# Patient Record
Sex: MALE | Race: WHITE | NOT HISPANIC OR LATINO | Employment: FULL TIME | URBAN - METROPOLITAN AREA
[De-identification: names, ages, dates, MRNs, and addresses within clinical notes are randomized per-mention and may not be internally consistent; named-entity substitution may affect disease eponyms.]

---

## 2017-02-16 ENCOUNTER — HOSPITAL ENCOUNTER (OUTPATIENT)
Dept: RADIOLOGY | Facility: HOSPITAL | Age: 32
Discharge: HOME | End: 2017-02-16
Attending: OTOLARYNGOLOGY
Payer: COMMERCIAL

## 2017-02-16 DIAGNOSIS — R05.9 COUGH: ICD-10-CM

## 2017-02-16 PROCEDURE — 71020 XR CHEST PA AND LATERAL PRE-OP: CPT | Mod: 26,,, | Performed by: RADIOLOGY

## 2017-02-16 PROCEDURE — 71020 XR CHEST PA AND LATERAL PRE-OP: CPT | Mod: TC

## 2017-03-09 ENCOUNTER — HOSPITAL ENCOUNTER (OUTPATIENT)
Dept: PREADMISSION TESTING | Facility: HOSPITAL | Age: 32
Discharge: HOME | End: 2017-03-09
Attending: OTOLARYNGOLOGY
Payer: COMMERCIAL

## 2017-03-09 ENCOUNTER — ANESTHESIA EVENT (OUTPATIENT)
Dept: SURGERY | Facility: HOSPITAL | Age: 32
End: 2017-03-09
Payer: COMMERCIAL

## 2017-03-09 DIAGNOSIS — Z01.818 PRE-OP EXAM: Primary | ICD-10-CM

## 2017-03-09 RX ORDER — SODIUM CHLORIDE, SODIUM LACTATE, POTASSIUM CHLORIDE, CALCIUM CHLORIDE 600; 310; 30; 20 MG/100ML; MG/100ML; MG/100ML; MG/100ML
INJECTION, SOLUTION INTRAVENOUS CONTINUOUS
Status: CANCELLED | OUTPATIENT
Start: 2017-03-09

## 2017-03-09 RX ORDER — LIDOCAINE HYDROCHLORIDE 10 MG/ML
1 INJECTION, SOLUTION EPIDURAL; INFILTRATION; INTRACAUDAL; PERINEURAL ONCE
Status: CANCELLED | OUTPATIENT
Start: 2017-03-09 | End: 2017-03-09

## 2017-03-09 RX ORDER — AMOXICILLIN AND CLAVULANATE POTASSIUM 875; 125 MG/1; MG/1
1 TABLET, FILM COATED ORAL 2 TIMES DAILY
Status: ON HOLD | COMMUNITY
Start: 2017-02-24 | End: 2017-03-14 | Stop reason: HOSPADM

## 2017-03-09 RX ORDER — CLINDAMYCIN PHOSPHATE 900 MG/50ML
900 INJECTION, SOLUTION INTRAVENOUS
Status: CANCELLED | OUTPATIENT
Start: 2017-03-14

## 2017-03-09 RX ORDER — DEXAMETHASONE SODIUM PHOSPHATE 10 MG/ML
12 INJECTION INTRAMUSCULAR; INTRAVENOUS
Status: CANCELLED | OUTPATIENT
Start: 2017-03-14

## 2017-03-09 NOTE — IP AVS SNAPSHOT
[facility]  [address]  [place] [address]  Phone: [phone]           Patient Discharge Instructions    Our goal is to set you up for success. This packet includes information on your condition, medications, and your home care. It will help you to care for yourself so you don't get sicker.     Please ask your nurse if you have any questions.        There are many details to remember when preparing for your surgery. Here is what you will need to do, please ask your nurse if there are more specific instructions and if you have any questions:    1. 24 hours before procedure Do not smoke or drink alcoholic beverages 24 hours prior to your procedure    2. Eating before procedure Do not eat or drink anything 8 hours before your procedure - this includes gum, mints, and candy.     3. Day of procedure Please remove all jewelry for the procedure. If you wear contact lenses, dentures, hearing aids or glasses, bring a container to put them in during your surgery and give to a family member for safekeeping.  If your doctor has scheduled you for an overnight stay, bring a small overnight bag with any personal items that you need.    4. After procedure Make arrangements in advance for transportation home by a responsible adult. It is not safe to drive a vehicle during the 24 hours following surgery.     PLEASE NOTE: You may be contacted the day before your surgery to confirm your surgery date and arrival time. The Surgery schedule has many variables which may affect the time of your surgery case. Family members should be available if your surgery time changes.                ** Verify the list of medication(s) below is accurate and up to date. Carry this with you in case of emergency. If your medications have changed, please notify your healthcare provider.             Medication List      Notice     You have not been prescribed any medications.               Please bring to all follow up  appointments:    1. A copy of your discharge instructions.  2. All medicines you are currently taking in their original bottles.  3. Identification and insurance card.    Please arrive 15 minutes ahead of scheduled appointment time.    Please call 24 hours in advance if you must reschedule your appointment and/or time.        Your Future Surgeries/Procedures     Mar 14, 2017   Surgery with Phoebe Benitez MD   [facility] ([facility])    [address] [place] [address] Ave  [place] [address]   [phone]                  Discharge Instructions       Your surgery is scheduled for 3/14/17.    Please report to Outpatient Surgery Intake Office on the 2nd FLOOR at 7:00a.m.          INSTRUCTIONS IMPORTANT!!!  ¨ Do not eat or drink after 12 midnight-including water. OK to brush teeth, no   gum, candy or mints!    ____  Proceed to [facility] on 3/9/17 for additional blood test.       ____  No powder, lotions or creams to surgical area.  ____  Please remove all jewelry, including piercings and leave at home.  ____  No money or valuables needed. Please leave at home.  ____  Please bring any documents given by your doctor.  ____  If going home the same day, arrange for a ride home. You will not be able to             drive if Anesthesia was used.  ____  Wear loose fitting clothing. Allow for dressings, bandages.  ____  Stop Aspirin, Ibuprofen, Motrin and Aleve at least 3-5 days before surgery, unless otherwise instructed by your doctor, or the nurse.   You MAY use Tylenol/acetaminophen until day of surgery.  ____  If you take diabetic medication, do not take am of surgery unless instructed by Doctor.  ____  Call MD for temperature above 101 degrees.  ____ Stop taking any Fish Oil supplement or any Vitamins that contain Vitamin E at least 5 days prior to surgery.  ____ Do Not wear your contact lenses the day of your procedure.  You may wear your glasses.        I have read or had read and  explained to me, and understand the above information.  Additional comments or instructions:  For additional questions call [phone]      Gargle with Listerine twice a day for 3 days prior to surgery, including the morning of surgery.        Anesthesia: General Anesthesia  Youre due to have surgery. During surgery, youll be given medication called anesthesia. (It is also called anesthetic.) This will keep you comfortable and pain-free. Your anesthesia provider will use general anesthesia. This sheet tells you more about it.  What is general anesthesia?     You are watched continuously during your procedure by the anesthesia provider   General anesthesia puts you into a state like deep sleep. It goes into the bloodstream (IV anesthetics), into the lungs (gas anesthetics), or both. You feel nothing during the procedure. You will not remember it. During the procedure, the anesthesia provider monitors you continuously. He or she checks your heart rate and rhythm, blood pressure, breathing, and blood oxygen.  · IV Anesthetics. IV anesthetics are given through an IV line in your arm. Theyre often given first. This is so you are asleep before a gas anesthetic is started. Some kinds of IV anesthetics relieve pain. Others relax you. Your doctor will decide which kind is best in your case.  · Gas Anesthetics. Gas anesthetics are breathed into the lungs. They are often used to keep you asleep. They can be given through a facemask or a tube placed in your larynx or trachea (breathing tube).  ¨ If you have a facemask, your anesthesia provider will most likely place it over your nose and mouth while youre still awake. Youll breathe oxygen through the mask as your IV anesthetic is started. Gas anesthetic may be added through the mask.  ¨ If you have a tube in the larynx or trachea, it will be inserted into your throat after youre asleep.  Anesthesia tools and medications  You will likely have:  · IV anesthetics. These are  put into an IV line into your bloodstream.  · Gas anesthetics. You breathe these anesthetics into your lungs, where they pass into your bloodstream.  · Pulse oximeter. This is a small clip that is attached to the end of your finger. This measures your blood oxygen level.  · Electrocardiography leads (electrodes). These are small sticky pads that are placed on your chest. They record your heart rate and rhythm.  · Blood pressure cuff. This reads your blood pressure.  Risks and possible complications  General anesthesia has some risks. These include:  · Breathing problems  · Nausea and vomiting  · Sore throat or hoarseness (usually temporary)  · Allergic reaction to the anesthetic  · Irregular heartbeat (rare)  · Cardiac arrest (rare)   Anesthesia safety  · Follow all instructions you are given for how long not to eat or drink before your procedure.  · Be sure your doctor knows what medications and drugs you take. This includes over-the-counter medications, herbs, supplements, alcohol or other drugs. You will be asked when those were last taken.  · Have an adult family member or friend drive you home after the procedure.  · For the first 24 hours after your surgery:  ¨ Do not drive or use heavy equipment.  ¨ Have a trusted family member or spouse make important decisions or sign documents.  ¨ Avoid alcohol.  ¨ Have a responsible adult stay with you. He or she can watch for problems and help keep you safe.  Date Last Reviewed: 10/16/2014  © 3120-0038 [employer]. [address] [place] [address]. All rights reserved. This information is not intended as a substitute for professional medical care. Always follow your healthcare professional's instructions.        Adult Tonsillectomy  The tonsils are 2 small masses of tissue at the back of the throat. They are part of the bodys immune system. This helps the body fight disease. In some people, the tonsils become infected or enlarged. This can cause  severe sore throats, snoring, or other problems. Tonsillectomy is surgery to remove the tonsils. Tonsillectomy may be recommended if you have obstruction causing sleep apnea, or recurring, chronic, or severe infections. This sheet tells you more about this surgery and what to expect.    Preparing for surgery  Prepare as you have been told. Tell your healthcare provider about all medicine you take. This includes over-the-counter drugs. It also includes herbs and other supplements. You may need to stop taking some or all of them before surgery as directed by your healthcare provider. Also, follow any directions youre given for not eating or drinking before surgery.  The day of surgery  The surgery takes about 60 minutes. You will likely go home on the same day.  Before the surgery  Here is what to expect before the surgery begins:  · An IV line is put into a vein in your arm or hand. This line supplies fluids and medicines.  · To keep you free of pain during the surgery, youre given general anesthesia. This medicine puts you into a state like deep sleep through the surgery.  During the surgery  Here is what to expect during the surgery:  · A special device is used to keep the mouth open.  · Other tools are used to remove the tonsils from the back of the throat. The tissue is taken out through the mouth.  · The device holding the mouth open is then removed.  After the surgery  You will be taken to a recovery room. Healthcare staff will make sure you can drink some liquids. They will also make sure your pain is being managed. When you are ready to leave the hospital, you will need to be driven home by an adult family member or friend.  Recovering at home  It will likely take about 2 weeks to heal from the surgery. During your recovery:  · Expect to have throat pain. You may also feel pain in your ears. This is referred pain from the throat, and is normal. Your post-surgery pain may come and go. It may be worse on  the 4th or 5th day after surgery.  · Talk as little as possible, if it is painful.  · Take pain medicine as directed.  · Do not drive while you are on opioid or narcotic pain medicine. Expect to feel sleepy or dizzy while you are taking this medicine.  · Do not use ibuprofen or aspirin for 14 days after surgery unless your healthcare provider says its OK. You may use acetaminophen as directed.  · Use 2 or 3 pillows under your head while resting. This will help keep swelling down.  · Drink lots of chilled liquids. Water, noncitrus juices, and frozen juice bars are good choices.  · Eat cold foods and soft foods, which are easiest to swallow. Try foods like ice cream, gelatin, scrambled eggs, pasta, and mashed potatoes.  · Avoid foods that require a lot of chewing. Also avoid foods that may scratch the throat, like toast or potato chips. Avoid hot, spicy, or acidic foods.  · Avoid strenuous activity for 2 to 3 weeks after surgery.  · Be aware that white patches will form in the throat during healing. These are scabs and are not a sign of infection. The patches will come off in 1 to 2 weeks and may cause bleeding. To minimize bleeding, drink lots of fluids. Gargling with cold water can help.  Call the healthcare provider  Call your healthcare provider if you have any of the following:  · Chest pain or trouble breathing (call 911)  · Fever of 100.4°F (38°C) or higher, or as directed by your healthcare provider  · Bright red bleeding from the mouth or nose  · Severe pain not relieved by medicine  · Signs of dehydration (dark urine, urinating less often)  · Heavy or persistent bleeding in the throat at any time  · Other signs or symptoms as indicated by your healthcare provider   Follow-up  Schedule a follow-up visit with your healthcare provider as advised. During this visit, the healthcare provider will make sure you are healing well. Ask any questions you have about the surgery or your recovery.  Risks and possible  complications   Risks of this surgery include:  · Infection  · Bleeding  · Injury to the lips or teeth  · Painful swallowing during recovery  · The need for a second surgery  · Risks of anesthesia    Date Last Reviewed: 6/11/2015  © 7967-4360 [employer]. [address] [place] [address]. All rights reserved. This information is not intended as a substitute for professional medical care. Always follow your healthcare professional's instructions.            Admission Information     Date & Time Provider Department CSN    3/9/2017  2:00 PM Phoebe Benitez MD [facility] [id number]      Care Providers     Provider Role Specialty Primary office phone    Phoebe Benitez MD Attending Provider Otolaryngology [phone]      Recent Lab Values     No lab values to display.      Allergies as of 3/9/2017     No Known Allergies      [facility]     [facility] Nurse Care Line - 24/7 Assistance  Unless otherwise directed by your provider, please contact [facility], our nurse care line that is available for 24/7 assistance.     Registered nurses in the [facility] provide clinical advisement, health education, appointment booking, and other advisory services.  Call for this free service at [phone].        Advance Directives     An advance directive is a document which, in the event you are no longer able to make decisions for yourself, tells your healthcare team what kind of treatment you do or do not want to receive, or who you would like to make those decisions for you.  If you do not currently have an advance directive, [facility] encourages you to create one.  For more information call:  [phone]), [phone] ([phone]),  or log on to [email or web address].        Language Assistance Services     ATTENTION: Language assistance services are available, free of charge. Please call [phone].      ATENCIÓN: Si  habla patrick, tiene a lowry disposición servicios gratuitos de asistencia lingüística. Llame al [phone].     PORTILLO Ý: N?u b?n nói Ti?ng Vi?t, có các d?ch v? h? tr? ngôn ng? mi?n phí dành cho b?n. G?i s? [phone].        [facility] Sign-Up     Activating your [facility] account is as easy as 1-2-3!     1) Visit [email or web address], select Sign Up Now, enter this activation code and your date of birth, then select Next.  [email or web address]  Expires: 4/23/2017  2:24 PM      2) Create a username and password to use when you visit [facility] in the future and select a security question in case you lose your password and select Next.    3) Enter your e-mail address and click Sign Up!    Additional Information  If you have questions, please e-mail [email or web address] or call [phone] to talk to our [facility] staff. Remember, [facility] is NOT to be used for urgent needs. For medical emergencies, dial 911.          [facility] complies with applicable Federal civil rights laws and does not discriminate on the basis of race, color, national origin, age, disability, or sex.

## 2017-03-09 NOTE — ANESTHESIA PREPROCEDURE EVALUATION
03/09/2017     Anjum Lee is a 31 y.o., male is scheduled for bilateral tonsillectomy under GETA on 3/14/2017.      [facility] Anesthesia Evaluation    I have reviewed the Patient Summary Reports.    I have reviewed the Nursing Notes.   I have reviewed the Medications.     Review of Systems  Anesthesia Hx:  No previous Anesthesia  Neg history of prior surgery. Denies Family Hx of Anesthesia complications.    Social:  Non-Smoker, No Alcohol Use    Hematology/Oncology:  Hematology Normal        EENT/Dental:   Has been on augmentin since 2/24/2017; no fever/chills Chronic Tonsillitis   Cardiovascular:  Cardiovascular Normal     Pulmonary:  Pulmonary Normal    Renal/:  Renal/ Normal     Hepatic/GI:  Hepatic/GI Normal    Neurological:  Neurology Normal    Endocrine:  Endocrine Normal        Physical Exam  General:  Well nourished    Airway/Jaw/Neck:  Airway Findings: Mouth Opening: Normal Tongue: Normal  General Airway Assessment: Adult  Mallampati: II  TM Distance: 4 - 6 cm        Eyes/Ears/Nose:  Eyes/Ears/Nose Findings: Tonsils bilaterally enlarged without exudate or erythema; airway visible    Dental:  Dental Findings: In tact   Chest/Lungs:  Chest/Lungs Clear    Heart/Vascular:  Heart Findings: Normal Heart murmur: negative    Abdomen:  Abdomen Findings: Normal      Mental Status:  Mental Status Findings: Normal        Anesthesia Plan  Type of Anesthesia, risks & benefits discussed:  Anesthesia Type:  general  Patient's Preference:   Intra-op Monitoring Plan:   Intra-op Monitoring Plan Comments:   Post Op Pain Control Plan:   Post Op Pain Control Plan Comments:   Induction:   IV  Beta Blocker:  Patient is not currently on a Beta-Blocker (No further documentation required).       Informed Consent: Patient understands risks and agrees with Anesthesia plan.  Questions answered.   ASA Score: 1     Day of  Surgery Review of History & Physical:        Anesthesia Plan Notes: Anesthesia consent will be obtained prior to procedure on 3/14/2017.          Ready For Surgery From Anesthesia Perspective.

## 2017-03-09 NOTE — DISCHARGE INSTRUCTIONS
Your surgery is scheduled for 3/14/17.    Please report to Outpatient Surgery Intake Office on the 2nd FLOOR at 7:00a.m.          INSTRUCTIONS IMPORTANT!!!  ¨ Do not eat or drink after 12 midnight-including water. OK to brush teeth, no   gum, candy or mints!    ____  Proceed to [facility] on 3/9/17 for additional blood test.       ____  No powder, lotions or creams to surgical area.  ____  Please remove all jewelry, including piercings and leave at home.  ____  No money or valuables needed. Please leave at home.  ____  Please bring any documents given by your doctor.  ____  If going home the same day, arrange for a ride home. You will not be able to             drive if Anesthesia was used.  ____  Wear loose fitting clothing. Allow for dressings, bandages.  ____  Stop Aspirin, Ibuprofen, Motrin and Aleve at least 3-5 days before surgery, unless otherwise instructed by your doctor, or the nurse.   You MAY use Tylenol/acetaminophen until day of surgery.  ____  If you take diabetic medication, do not take am of surgery unless instructed by Doctor.  ____  Call MD for temperature above 101 degrees.  ____ Stop taking any Fish Oil supplement or any Vitamins that contain Vitamin E at least 5 days prior to surgery.  ____ Do Not wear your contact lenses the day of your procedure.  You may wear your glasses.        I have read or had read and explained to me, and understand the above information.  Additional comments or instructions:  For additional questions call [phone]      Gargle with Listerine twice a day for 3 days prior to surgery, including the morning of surgery.        Anesthesia: General Anesthesia  Youre due to have surgery. During surgery, youll be given medication called anesthesia. (It is also called anesthetic.) This will keep you comfortable and pain-free. Your anesthesia provider will use general anesthesia. This sheet tells you more about it.  What is general anesthesia?     You are  watched continuously during your procedure by the anesthesia provider   General anesthesia puts you into a state like deep sleep. It goes into the bloodstream (IV anesthetics), into the lungs (gas anesthetics), or both. You feel nothing during the procedure. You will not remember it. During the procedure, the anesthesia provider monitors you continuously. He or she checks your heart rate and rhythm, blood pressure, breathing, and blood oxygen.  · IV Anesthetics. IV anesthetics are given through an IV line in your arm. Theyre often given first. This is so you are asleep before a gas anesthetic is started. Some kinds of IV anesthetics relieve pain. Others relax you. Your doctor will decide which kind is best in your case.  · Gas Anesthetics. Gas anesthetics are breathed into the lungs. They are often used to keep you asleep. They can be given through a facemask or a tube placed in your larynx or trachea (breathing tube).  ¨ If you have a facemask, your anesthesia provider will most likely place it over your nose and mouth while youre still awake. Youll breathe oxygen through the mask as your IV anesthetic is started. Gas anesthetic may be added through the mask.  ¨ If you have a tube in the larynx or trachea, it will be inserted into your throat after youre asleep.  Anesthesia tools and medications  You will likely have:  · IV anesthetics. These are put into an IV line into your bloodstream.  · Gas anesthetics. You breathe these anesthetics into your lungs, where they pass into your bloodstream.  · Pulse oximeter. This is a small clip that is attached to the end of your finger. This measures your blood oxygen level.  · Electrocardiography leads (electrodes). These are small sticky pads that are placed on your chest. They record your heart rate and rhythm.  · Blood pressure cuff. This reads your blood pressure.  Risks and possible complications  General anesthesia has some risks. These include:  · Breathing  problems  · Nausea and vomiting  · Sore throat or hoarseness (usually temporary)  · Allergic reaction to the anesthetic  · Irregular heartbeat (rare)  · Cardiac arrest (rare)   Anesthesia safety  · Follow all instructions you are given for how long not to eat or drink before your procedure.  · Be sure your doctor knows what medications and drugs you take. This includes over-the-counter medications, herbs, supplements, alcohol or other drugs. You will be asked when those were last taken.  · Have an adult family member or friend drive you home after the procedure.  · For the first 24 hours after your surgery:  ¨ Do not drive or use heavy equipment.  ¨ Have a trusted family member or spouse make important decisions or sign documents.  ¨ Avoid alcohol.  ¨ Have a responsible adult stay with you. He or she can watch for problems and help keep you safe.  Date Last Reviewed: 10/16/2014  © 6472-5958 [employer]. [address] [place] [address]. All rights reserved. This information is not intended as a substitute for professional medical care. Always follow your healthcare professional's instructions.        Adult Tonsillectomy  The tonsils are 2 small masses of tissue at the back of the throat. They are part of the bodys immune system. This helps the body fight disease. In some people, the tonsils become infected or enlarged. This can cause severe sore throats, snoring, or other problems. Tonsillectomy is surgery to remove the tonsils. Tonsillectomy may be recommended if you have obstruction causing sleep apnea, or recurring, chronic, or severe infections. This sheet tells you more about this surgery and what to expect.    Preparing for surgery  Prepare as you have been told. Tell your healthcare provider about all medicine you take. This includes over-the-counter drugs. It also includes herbs and other supplements. You may need to stop taking some or all of them before surgery as directed by your  healthcare provider. Also, follow any directions youre given for not eating or drinking before surgery.  The day of surgery  The surgery takes about 60 minutes. You will likely go home on the same day.  Before the surgery  Here is what to expect before the surgery begins:  · An IV line is put into a vein in your arm or hand. This line supplies fluids and medicines.  · To keep you free of pain during the surgery, youre given general anesthesia. This medicine puts you into a state like deep sleep through the surgery.  During the surgery  Here is what to expect during the surgery:  · A special device is used to keep the mouth open.  · Other tools are used to remove the tonsils from the back of the throat. The tissue is taken out through the mouth.  · The device holding the mouth open is then removed.  After the surgery  You will be taken to a recovery room. Healthcare staff will make sure you can drink some liquids. They will also make sure your pain is being managed. When you are ready to leave the hospital, you will need to be driven home by an adult family member or friend.  Recovering at home  It will likely take about 2 weeks to heal from the surgery. During your recovery:  · Expect to have throat pain. You may also feel pain in your ears. This is referred pain from the throat, and is normal. Your post-surgery pain may come and go. It may be worse on the 4th or 5th day after surgery.  · Talk as little as possible, if it is painful.  · Take pain medicine as directed.  · Do not drive while you are on opioid or narcotic pain medicine. Expect to feel sleepy or dizzy while you are taking this medicine.  · Do not use ibuprofen or aspirin for 14 days after surgery unless your healthcare provider says its OK. You may use acetaminophen as directed.  · Use 2 or 3 pillows under your head while resting. This will help keep swelling down.  · Drink lots of chilled liquids. Water, noncitrus juices, and frozen juice bars are  good choices.  · Eat cold foods and soft foods, which are easiest to swallow. Try foods like ice cream, gelatin, scrambled eggs, pasta, and mashed potatoes.  · Avoid foods that require a lot of chewing. Also avoid foods that may scratch the throat, like toast or potato chips. Avoid hot, spicy, or acidic foods.  · Avoid strenuous activity for 2 to 3 weeks after surgery.  · Be aware that white patches will form in the throat during healing. These are scabs and are not a sign of infection. The patches will come off in 1 to 2 weeks and may cause bleeding. To minimize bleeding, drink lots of fluids. Gargling with cold water can help.  Call the healthcare provider  Call your healthcare provider if you have any of the following:  · Chest pain or trouble breathing (call 911)  · Fever of 100.4°F (38°C) or higher, or as directed by your healthcare provider  · Bright red bleeding from the mouth or nose  · Severe pain not relieved by medicine  · Signs of dehydration (dark urine, urinating less often)  · Heavy or persistent bleeding in the throat at any time  · Other signs or symptoms as indicated by your healthcare provider   Follow-up  Schedule a follow-up visit with your healthcare provider as advised. During this visit, the healthcare provider will make sure you are healing well. Ask any questions you have about the surgery or your recovery.  Risks and possible complications   Risks of this surgery include:  · Infection  · Bleeding  · Injury to the lips or teeth  · Painful swallowing during recovery  · The need for a second surgery  · Risks of anesthesia    Date Last Reviewed: 6/11/2015  © 8223-0897 [employer]. [address] [place] [address], [place] [address]. All rights reserved. This information is not intended as a substitute for professional medical care. Always follow your healthcare professional's instructions.

## 2017-03-14 ENCOUNTER — ANESTHESIA (OUTPATIENT)
Dept: SURGERY | Facility: HOSPITAL | Age: 32
End: 2017-03-14
Payer: COMMERCIAL

## 2017-03-14 ENCOUNTER — HOSPITAL ENCOUNTER (OUTPATIENT)
Facility: HOSPITAL | Age: 32
Discharge: HOME | End: 2017-03-14
Attending: OTOLARYNGOLOGY | Admitting: OTOLARYNGOLOGY
Payer: COMMERCIAL

## 2017-03-14 VITALS
DIASTOLIC BLOOD PRESSURE: 72 MMHG | TEMPERATURE: 98 F | HEART RATE: 80 BPM | SYSTOLIC BLOOD PRESSURE: 132 MMHG | OXYGEN SATURATION: 96 % | RESPIRATION RATE: 16 BRPM

## 2017-03-14 DIAGNOSIS — J35.1 TONSILLAR HYPERTROPHY: ICD-10-CM

## 2017-03-14 DIAGNOSIS — Z01.818 PRE-OP EXAM: ICD-10-CM

## 2017-03-14 LAB — APTT BLDCRRT: 31.3 SEC

## 2017-03-14 PROCEDURE — 36415 COLL VENOUS BLD VENIPUNCTURE: CPT

## 2017-03-14 PROCEDURE — 25000003 PHARM REV CODE 250: Performed by: NURSE PRACTITIONER

## 2017-03-14 PROCEDURE — 88189 FLOWCYTOMETRY/READ 16 & >: CPT | Mod: ,,, | Performed by: PATHOLOGY

## 2017-03-14 PROCEDURE — 88304 TISSUE EXAM BY PATHOLOGIST: CPT | Mod: 26,,, | Performed by: PATHOLOGY

## 2017-03-14 PROCEDURE — 85730 THROMBOPLASTIN TIME PARTIAL: CPT

## 2017-03-14 PROCEDURE — 88184 FLOWCYTOMETRY/ TC 1 MARKER: CPT | Performed by: PATHOLOGY

## 2017-03-14 PROCEDURE — 36000707: Performed by: OTOLARYNGOLOGY

## 2017-03-14 PROCEDURE — 37000009 HC ANESTHESIA EA ADD 15 MINS: Performed by: OTOLARYNGOLOGY

## 2017-03-14 PROCEDURE — 88185 FLOWCYTOMETRY/TC ADD-ON: CPT | Mod: 59 | Performed by: PATHOLOGY

## 2017-03-14 PROCEDURE — 71000015 HC POSTOP RECOV 1ST HR: Performed by: OTOLARYNGOLOGY

## 2017-03-14 PROCEDURE — 71000033 HC RECOVERY, INTIAL HOUR: Performed by: OTOLARYNGOLOGY

## 2017-03-14 PROCEDURE — 37000008 HC ANESTHESIA 1ST 15 MINUTES: Performed by: OTOLARYNGOLOGY

## 2017-03-14 PROCEDURE — 88104 CYTOPATH FL NONGYN SMEARS: CPT | Performed by: PATHOLOGY

## 2017-03-14 PROCEDURE — 71000039 HC RECOVERY, EACH ADD'L HOUR: Performed by: OTOLARYNGOLOGY

## 2017-03-14 PROCEDURE — 25000003 PHARM REV CODE 250: Performed by: NURSE ANESTHETIST, CERTIFIED REGISTERED

## 2017-03-14 PROCEDURE — 63600175 PHARM REV CODE 636 W HCPCS: Performed by: NURSE ANESTHETIST, CERTIFIED REGISTERED

## 2017-03-14 PROCEDURE — 88104 CYTOPATH FL NONGYN SMEARS: CPT | Mod: 26,,, | Performed by: PATHOLOGY

## 2017-03-14 PROCEDURE — 36000706: Performed by: OTOLARYNGOLOGY

## 2017-03-14 PROCEDURE — 63600175 PHARM REV CODE 636 W HCPCS: Performed by: OTOLARYNGOLOGY

## 2017-03-14 PROCEDURE — 25000003 PHARM REV CODE 250: Performed by: OTOLARYNGOLOGY

## 2017-03-14 RX ORDER — ONDANSETRON 2 MG/ML
INJECTION INTRAMUSCULAR; INTRAVENOUS
Status: DISCONTINUED | OUTPATIENT
Start: 2017-03-14 | End: 2017-03-14

## 2017-03-14 RX ORDER — SUCCINYLCHOLINE CHLORIDE 20 MG/ML
INJECTION INTRAMUSCULAR; INTRAVENOUS
Status: DISCONTINUED | OUTPATIENT
Start: 2017-03-14 | End: 2017-03-14

## 2017-03-14 RX ORDER — LIDOCAINE HYDROCHLORIDE 10 MG/ML
1 INJECTION, SOLUTION EPIDURAL; INFILTRATION; INTRACAUDAL; PERINEURAL ONCE
Status: DISCONTINUED | OUTPATIENT
Start: 2017-03-14 | End: 2017-03-14 | Stop reason: HOSPADM

## 2017-03-14 RX ORDER — ONDANSETRON 8 MG/1
8 TABLET, ORALLY DISINTEGRATING ORAL ONCE
Status: DISCONTINUED | OUTPATIENT
Start: 2017-03-14 | End: 2017-03-14 | Stop reason: HOSPADM

## 2017-03-14 RX ORDER — PHENYLEPHRINE HYDROCHLORIDE 10 MG/ML
INJECTION INTRAVENOUS
Status: DISCONTINUED | OUTPATIENT
Start: 2017-03-14 | End: 2017-03-14

## 2017-03-14 RX ORDER — SODIUM CHLORIDE 0.9 % (FLUSH) 0.9 %
3 SYRINGE (ML) INJECTION
Status: DISCONTINUED | OUTPATIENT
Start: 2017-03-14 | End: 2017-03-14 | Stop reason: HOSPADM

## 2017-03-14 RX ORDER — CLINDAMYCIN PHOSPHATE 900 MG/50ML
900 INJECTION, SOLUTION INTRAVENOUS
Status: DISCONTINUED | OUTPATIENT
Start: 2017-03-14 | End: 2017-03-14 | Stop reason: HOSPADM

## 2017-03-14 RX ORDER — HYDROMORPHONE HYDROCHLORIDE 2 MG/ML
0.5 INJECTION, SOLUTION INTRAMUSCULAR; INTRAVENOUS; SUBCUTANEOUS EVERY 5 MIN PRN
Status: DISCONTINUED | OUTPATIENT
Start: 2017-03-14 | End: 2017-03-14 | Stop reason: HOSPADM

## 2017-03-14 RX ORDER — HYDROCODONE BITARTRATE AND ACETAMINOPHEN 5; 325 MG/1; MG/1
1 TABLET ORAL EVERY 4 HOURS PRN
Status: DISCONTINUED | OUTPATIENT
Start: 2017-03-14 | End: 2017-03-14 | Stop reason: HOSPADM

## 2017-03-14 RX ORDER — DEXAMETHASONE SODIUM PHOSPHATE 10 MG/ML
12 INJECTION INTRAMUSCULAR; INTRAVENOUS
Status: DISCONTINUED | OUTPATIENT
Start: 2017-03-14 | End: 2017-03-14 | Stop reason: HOSPADM

## 2017-03-14 RX ORDER — AMOXICILLIN AND CLAVULANATE POTASSIUM 600; 42.9 MG/5ML; MG/5ML
875 POWDER, FOR SUSPENSION ORAL EVERY 12 HOURS
Qty: 98 ML | Refills: 0 | Status: SHIPPED | OUTPATIENT
Start: 2017-03-14 | End: 2017-03-21

## 2017-03-14 RX ORDER — FENTANYL CITRATE 50 UG/ML
INJECTION, SOLUTION INTRAMUSCULAR; INTRAVENOUS
Status: DISCONTINUED | OUTPATIENT
Start: 2017-03-14 | End: 2017-03-14

## 2017-03-14 RX ORDER — PROPOFOL 10 MG/ML
VIAL (ML) INTRAVENOUS
Status: DISCONTINUED | OUTPATIENT
Start: 2017-03-14 | End: 2017-03-14

## 2017-03-14 RX ORDER — SODIUM CHLORIDE, SODIUM LACTATE, POTASSIUM CHLORIDE, CALCIUM CHLORIDE 600; 310; 30; 20 MG/100ML; MG/100ML; MG/100ML; MG/100ML
INJECTION, SOLUTION INTRAVENOUS CONTINUOUS
Status: DISCONTINUED | OUTPATIENT
Start: 2017-03-14 | End: 2017-03-14 | Stop reason: HOSPADM

## 2017-03-14 RX ORDER — ROCURONIUM BROMIDE 10 MG/ML
INJECTION, SOLUTION INTRAVENOUS
Status: DISCONTINUED | OUTPATIENT
Start: 2017-03-14 | End: 2017-03-14

## 2017-03-14 RX ORDER — HYDROCODONE BITARTRATE AND ACETAMINOPHEN 7.5; 325 MG/1; MG/1
1 TABLET ORAL EVERY 4 HOURS PRN
Qty: 30 TABLET | Refills: 0 | Status: SHIPPED | OUTPATIENT
Start: 2017-03-14 | End: 2020-09-24

## 2017-03-14 RX ORDER — DEXTROSE, SODIUM CHLORIDE, SODIUM LACTATE, POTASSIUM CHLORIDE, AND CALCIUM CHLORIDE 5; .6; .31; .03; .02 G/100ML; G/100ML; G/100ML; G/100ML; G/100ML
INJECTION, SOLUTION INTRAVENOUS CONTINUOUS
Status: DISCONTINUED | OUTPATIENT
Start: 2017-03-14 | End: 2017-03-14 | Stop reason: HOSPADM

## 2017-03-14 RX ORDER — ONDANSETRON 4 MG/1
4 TABLET, ORALLY DISINTEGRATING ORAL EVERY 6 HOURS PRN
Qty: 15 TABLET | Refills: 1 | Status: SHIPPED | OUTPATIENT
Start: 2017-03-14 | End: 2020-09-24

## 2017-03-14 RX ORDER — ACETAMINOPHEN 10 MG/ML
INJECTION, SOLUTION INTRAVENOUS
Status: DISCONTINUED | OUTPATIENT
Start: 2017-03-14 | End: 2017-03-14

## 2017-03-14 RX ORDER — MIDAZOLAM HYDROCHLORIDE 1 MG/ML
INJECTION, SOLUTION INTRAMUSCULAR; INTRAVENOUS
Status: DISCONTINUED | OUTPATIENT
Start: 2017-03-14 | End: 2017-03-14

## 2017-03-14 RX ORDER — LIDOCAINE HCL/PF 100 MG/5ML
SYRINGE (ML) INTRAVENOUS
Status: DISCONTINUED | OUTPATIENT
Start: 2017-03-14 | End: 2017-03-14

## 2017-03-14 RX ORDER — ONDANSETRON 2 MG/ML
4 INJECTION INTRAMUSCULAR; INTRAVENOUS ONCE AS NEEDED
Status: DISCONTINUED | OUTPATIENT
Start: 2017-03-14 | End: 2017-03-14 | Stop reason: HOSPADM

## 2017-03-14 RX ADMIN — CLINDAMYCIN PHOSPHATE 900 MG: 18 INJECTION, SOLUTION INTRAVENOUS at 09:03

## 2017-03-14 RX ADMIN — ONDANSETRON 8 MG: 2 INJECTION, SOLUTION INTRAMUSCULAR; INTRAVENOUS at 10:03

## 2017-03-14 RX ADMIN — SUCCINYLCHOLINE CHLORIDE 120 MG: 20 INJECTION, SOLUTION INTRAMUSCULAR; INTRAVENOUS at 09:03

## 2017-03-14 RX ADMIN — DEXAMETHASONE SODIUM PHOSPHATE 12 MG: 10 INJECTION, SOLUTION INTRAMUSCULAR; INTRAVENOUS at 09:03

## 2017-03-14 RX ADMIN — MIDAZOLAM 2 MG: 1 INJECTION INTRAMUSCULAR; INTRAVENOUS at 09:03

## 2017-03-14 RX ADMIN — ACETAMINOPHEN 1000 MG: 10 INJECTION, SOLUTION INTRAVENOUS at 10:03

## 2017-03-14 RX ADMIN — PROPOFOL 50 MG: 10 INJECTION, EMULSION INTRAVENOUS at 09:03

## 2017-03-14 RX ADMIN — FENTANYL CITRATE 100 MCG: 50 INJECTION, SOLUTION INTRAMUSCULAR; INTRAVENOUS at 09:03

## 2017-03-14 RX ADMIN — PROPOFOL 150 MG: 10 INJECTION, EMULSION INTRAVENOUS at 09:03

## 2017-03-14 RX ADMIN — ROCURONIUM BROMIDE 5 MG: 10 INJECTION, SOLUTION INTRAVENOUS at 09:03

## 2017-03-14 RX ADMIN — SODIUM CHLORIDE, SODIUM LACTATE, POTASSIUM CHLORIDE, AND CALCIUM CHLORIDE: .6; .31; .03; .02 INJECTION, SOLUTION INTRAVENOUS at 09:03

## 2017-03-14 RX ADMIN — PHENYLEPHRINE HYDROCHLORIDE 50 MCG: 10 INJECTION INTRAVENOUS at 10:03

## 2017-03-14 RX ADMIN — SODIUM CHLORIDE, SODIUM LACTATE, POTASSIUM CHLORIDE, AND CALCIUM CHLORIDE: .6; .31; .03; .02 INJECTION, SOLUTION INTRAVENOUS at 10:03

## 2017-03-14 RX ADMIN — FENTANYL CITRATE 50 MCG: 50 INJECTION, SOLUTION INTRAMUSCULAR; INTRAVENOUS at 10:03

## 2017-03-14 RX ADMIN — LIDOCAINE HYDROCHLORIDE 50 MG: 20 INJECTION, SOLUTION INTRAVENOUS at 09:03

## 2017-03-14 NOTE — IP AVS SNAPSHOT
[facility]  [address]  [place] [address]  Phone: [phone]           Patient Discharge Instructions     Our goal is to set you up for success. This packet includes information on your condition, medications, and your home care. It will help you to care for yourself so you don't get sicker and need to go back to the hospital.     Please ask your nurse if you have any questions.        There are many details to remember when preparing to leave the hospital. Here is what you will need to do:    1. Take your medicine. If you are prescribed medications, review your Medication List in the following pages. You may have new medications to  at the pharmacy and others that you'll need to stop taking. Review the instructions for how and when to take your medications. Talk with your doctor or nurses if you are unsure of what to do.     2. Go to your follow-up appointments. Specific follow-up information is listed in the following pages. Your may be contacted by a transition nurse or clinical provider about future appointments. Be sure we have all of the phone numbers to reach you, if needed. Please contact your provider's office if you are unable to make an appointment.     3. Watch for warning signs. Your doctor or nurse will give you detailed warning signs to watch for and when to call for assistance. These instructions may also include educational information about your condition. If you experience any of warning signs to your health, call your doctor.               ** Verify the list of medication(s) below is accurate and up to date. Carry this with you in case of emergency. If your medications have changed, please notify your healthcare provider.             Medication List      START taking these medications        Additional Info                      amoxicillin-clavulanate 600-42.9 mg/5 mL Susr   Commonly known as:  AUGMENTIN   Quantity:  98 mL   Refills:  0   Dose:  875 mg of  amoxicillin   Replaces:  amoxicillin-clavulanate 875-125mg 875-125 mg per tablet    Instructions:  Take 7 mLs (840 mg total) by mouth every 12 (twelve) hours.     Begin Date    AM    Noon    PM    Bedtime       hydrocodone-acetaminophen 7.5-325mg 7.5-325 mg per tablet   Commonly known as:  NORCO   Quantity:  30 tablet   Refills:  0   Dose:  1 tablet    Instructions:  Take 1 tablet by mouth every 4 (four) hours as needed for Pain (prn moderate pain.).     Begin Date    AM    Noon    PM    Bedtime       ondansetron 4 MG Tbdl   Commonly known as:  ZOFRAN-ODT   Quantity:  15 tablet   Refills:  1   Dose:  4 mg    Instructions:  Take 1 tablet (4 mg total) by mouth every 6 (six) hours as needed.     Begin Date    AM    Noon    PM    Bedtime         STOP taking these medications     amoxicillin-clavulanate 875-125mg 875-125 mg per tablet   Commonly known as:  AUGMENTIN   Replaced by:  amoxicillin-clavulanate 600-42.9 mg/5 mL Susr            Where to Get Your Medications      You can get these medications from any pharmacy     Bring a paper prescription for each of these medications     amoxicillin-clavulanate 600-42.9 mg/5 mL Susr    hydrocodone-acetaminophen 7.5-325mg 7.5-325 mg per tablet    ondansetron 4 MG Tbdl                  Please bring to all follow up appointments:    1. A copy of your discharge instructions.  2. All medicines you are currently taking in their original bottles.  3. Identification and insurance card.    Please arrive 15 minutes ahead of scheduled appointment time.    Please call 24 hours in advance if you must reschedule your appointment and/or time.        Follow-up Information     Follow up In 2 weeks.        Follow up with Phoebe Benitez MD. Schedule an appointment as soon as possible for a visit in 2 weeks.    Specialty:  Otolaryngology    Contact information:    [address]  [address]  [place] [address] [phone]          Discharge Instructions     Future Orders    Call MD  for:  persistent nausea and vomiting     Call MD for:  severe uncontrolled pain     Call MD for:  temperature >100.4     Call MD for:     Comments:    Call M.D. If questions or problems.    Diet general     Comments:    Cool soft diet    Questions:    Total calories:      Fat restriction, if any:      Protein restriction, if any:      Na restriction, if any:      Fluid restriction:      Additional restrictions:      No dressing needed     Other restrictions (specify):     Scheduling Instructions:    See my office T and A instruction sheet.    Comments:    Keep head elevated.  No nose blowing.  No heavy lifting or bending over.        Discharge Instructions       ***NO HEAVY LIFTING OR BENDING  ***NO STRAWS.  ***COOL, SOFT DIET.    DIET: You may resume your home diet. If nausea is present, increase your diet gradually with fluids and bland foods    ACTIVITY LEVEL: If you have received sedation or an anesthetic, you may feel sleepy for several hours. Rest until you are more awake. Gradually resume your normal activities    Medications: Pain medication should be taken only if needed and as directed. If antibiotics are prescribed, the medication should be taken until completed. You will be given an updated list of you medications.    No driving, alcoholic beverages or signing legal documents for next 24 hours or while taking pain medication.       CALL THE DOCTOR:    For any obvious bleeding (some dried blood over the incision is normal).      Redness, swelling, foul smell around incision or fever over 101.   Shortness of breath, Coughing up Bloody sputum, Pains or Swelling in your Calves .   Persistent pain or nausea not relieved by medication.    If any unusual problems or difficulties occur contact your doctor. If you cannot contact your doctor but feel your signs and symptoms warrant a physicians attention return to the emergency room.      Discharge Instructions: After Your Surgery  Youve just had surgery.  During surgery you were given medicine called anesthesia to keep you relaxed and free of pain. After surgery you may have some pain or nausea. This is common. Here are some tips for feeling better and getting well after surgery.     Stay on schedule with your medication.   Going home  Your doctor or nurse will show you how to take care of yourself when you go home. He or she will also answer your questions. Have an adult family member or friend drive you home. For the first 24 hours after your surgery:  · Do not drive or use heavy equipment.  · Do not make important decisions or sign legal papers.  · Do not drink alcohol.  · Have someone stay with you, if needed. He or she can watch for problems and help keep you safe.  Be sure to go to all follow-up visits with your doctor. And rest after your surgery for as long as your doctor tells you to.  Coping with pain  If you have pain after surgery, pain medicine will help you feel better. Take it as told, before pain becomes severe. Also, ask your doctor or pharmacist about other ways to control pain. This might be with heat, ice, or relaxation. And follow any other instructions your surgeon or nurse gives you.  Tips for taking pain medicine  To get the best relief possible, remember these points:  · Pain medicines can upset your stomach. Taking them with a little food may help.  · Most pain relievers taken by mouth need at least 20 to 30 minutes to start to work.  · Taking medicine on a schedule can help you remember to take it. Try to time your medicine so that you can take it before starting an activity. This might be before you get dressed, go for a walk, or sit down for dinner.  · Constipation is a common side effect of pain medicines. Call your doctor before taking any medicines such as laxatives or stool softeners to help ease constipation. Also ask if you should skip any foods. Drinking lots of fluids and eating foods such as fruits and vegetables that are high in  fiber can also help. Remember, do not take laxatives unless your surgeon has prescribed them.  · Drinking alcohol and taking pain medicine can cause dizziness and slow your breathing. It can even be deadly. Do not drink alcohol while taking pain medicine.  · Pain medicine can make you react more slowly to things. Do not drive or run machinery while taking pain medicine.  Your health care provider may tell you to take acetaminophen to help ease your pain. Ask him or her how much you are supposed to take each day. Acetaminophen or other pain relievers may interact with your prescription medicines or other over-the-counter (OTC) drugs. Some prescription medicines have acetaminophen and other ingredients. Using both prescription and OTC acetaminophen for pain can cause you to overdose. Read the labels on your OTC medicines with care. This will help you to clearly know the list of ingredients, how much to take, and any warnings. It may also help you not take too much acetaminophen. If you have questions or do not understand the information, ask your pharmacist or health care provider to explain it to you before you take the OTC medicine.  Managing nausea  Some people have an upset stomach after surgery. This is often because of anesthesia, pain, or pain medicine, or the stress of surgery. These tips will help you handle nausea and eat healthy foods as you get better. If you were on a special food plan before surgery, ask your doctor if you should follow it while you get better. These tips may help:  · Do not push yourself to eat. Your body will tell you when to eat and how much.  · Start off with clear liquids and soup. They are easier to digest.  · Next try semi-solid foods, such as mashed potatoes, applesauce, and gelatin, as you feel ready.  · Slowly move to solid foods. Dont eat fatty, rich, or spicy foods at first.  · Do not force yourself to have 3 large meals a day. Instead eat smaller amounts more often.  · Take  pain medicines with a small amount of solid food, such as crackers or toast, to avoid nausea.     Call your surgeon if  · You still have pain an hour after taking medicine. The medicine may not be strong enough.  · You feel too sleepy, dizzy, or groggy. The medicine may be too strong.  · You have side effects like nausea, vomiting, or skin changes, such as rash, itching, or hives.       If you have obstructive sleep apnea  You were given anesthesia medicine during surgery to keep you comfortable and free of pain. After surgery, you may have more apnea spells because of this medicine and other medicines you were given. The spells may last longer than usual.   At home:  · Keep using the continuous positive airway pressure (CPAP) device when you sleep. Unless your health care provider tells you not to, use it when you sleep, day or night. CPAP is a common device used to treat obstructive sleep apnea.  · Talk with your provider before taking any pain medicine, muscle relaxants, or sedatives. Your provider will tell you about the possible dangers of taking these medicines.  Date Last Reviewed: 10/16/2014  © 2961-5438 [employer]. [address] [place] [address]. All rights reserved. This information is not intended as a substitute for professional medical care. Always follow your healthcare professional's instructions.      Tonsillectomy, Post-Op Bleeding  You have had surgery to remove your tonsils. Bleeding at the surgery site can happen after surgery. The doctor can often control it using direct pressure or applying medicine to shrink the blood vessels. If this fails, you will need to return to the operating room for further treatment. Notify your doctor at once or return to this hospital if there are signs of any further bleeding.  Home Care  Your throat will be sore. Throat pain after surgery improves over the first 3-5 days. It is normal to have more pain at the end of the first week before  it finally goes away.  Soft foods will be easier to swallow.  Drink plenty of fluids to prevent dehydration. You must continue to drink even though your throat hurts.  For pain relief, suck on ice cubes or frozen fruit pops, eat ice cream or sherbet, and drink cold liquids. You may also use liquid acetaminophen. Avoid taking ibuprofen or aspirin. These may increase bleeding risk. If your doctor has prescribed pain medication, take this as directed.   If antibiotics were prescribed, take these as directed until they are gone.  Do not overheat your home since this dries the air and may irritate throat tissues, especially at night. A cool-mist humidifier in the bedroom may help.  Avoid exposure to people with colds or the flu during the recovery period. You may be more likely to get ill during this time.  Smoking irritates the surgery site. If you smoke, this is a good time to stop.  Avoid any heavy exercise, lifting, or straining for the next 10 days.  Follow-up care  Follow up with your doctor or this facility as advised.  When to see medical advice  Call your doctor right away if any of the following occur:  Trouble speaking, swallowing, or breathing  Nausea and vomiting  Bleeding from the mouth  Fever over 100.4°F (38.3ºC)  Increasing pain not controlled by pain medications  Signs of dehydration: Very dark urine, less urine, dry mouth, weakness  Date Last Reviewed: 4/20/2015 © 2000-2016 [employer]. [address] [place] [address]. All rights reserved. This information is not intended as a substitute for professional medical care. Always follow your healthcare professional's instructions.        After Tonsillectomy/Adenoidectomy     Drinking plenty of fluids will help your child recover.   Your child has had surgery to remove tonsils and/or adenoids. Your child will need time to get better. Below are guidelines for your childs recovery.  Pain and Activity  · Expect your child to have some  throat or ear pain for 1-2 weeks.  · Limit activity for 1-2 weeks or as advised.  Diet  Make sure your child gets enough fluids and nutrients. Food and drink guidelines include:  · Give lots of fluids. Good choices are water, popsicles, and mild juices. (Do not give citrus juice or other acidic juices.)  · Give soft foods to eat. These include gelatin, pudding, ice cream, scrambled eggs, pasta, and mashed foods.  · Do not give spicy, acidic, or rough foods. These include fresh fruits, toast, crackers, and potato chips.  Medication  Give only medications approved by your childs health care provider. Follow directions closely when giving your child medications.  · Your child may be prescribed pain medication.  · Do not give your child ibuprofen or aspirin. They may cause bleeding. If needed for discomfort, you can give your child acetaminophen instead.  When to Call Your Child's Health Care Provider  Mild pain and a slight fever are normal after surgery. The surgical site will turn whitish while it is healing. This is normal and not an infection. But call your child's health care provider right away if your otherwise healthy child has any of the following:  · Persistent fever:  ¨ In a child 3 to 36 months, a rectal temperature of 102°F (39.0°C) or higher  ¨ In a child of any age who has a temperature of 103°F (39.4°C) or higher  ¨ A fever that lasts more than 24-hours in a child under 2 years old, or for 3 days in a child 2 years or older  ¨ Your child has had a seizure caused by the fever  · Severe pain not relieved by medication  · Bright red bleeding, which includes fast bleeding, spitting, or coughing up a large clot, or blood-tinged spit that continues  · Trouble breathing   Date Last Reviewed: 9/8/2014  © 0616-0346 [employer]. [address] [place] [address], [place] [address]. All rights reserved. This information is not intended as a substitute for professional medical care. Always follow your  healthcare professional's instructions.              Admission Information     Date & Time Provider Department CSN    3/14/2017  7:03 AM Phoebe Benitez MD [facility] [id number]      Care Providers     Provider Role Specialty Primary office phone    Phoebe Benitez MD Attending Provider Otolaryngology [phone]    Phoebe Benitez MD Surgeon  Otolaryngology [phone]      Your Vitals Were     BP Pulse Temp Resp SpO2       137/78 72 97.9 °F (36.6 °C) (Oral) 15 95%       Recent Lab Values     No lab values to display.      Pending Labs     Order Current Status    Leukemia/Lymphoma Screen - Tissue Other (Specify Comments Below) (tonsils) In process    Specimen to Pathology - Surgery In process      Allergies as of 3/14/2017     No Known Allergies      [facility]     [facility] Nurse Care Line - 24/7 Assistance  Unless otherwise directed by your provider, please contact [facility], our nurse care line that is available for 24/7 assistance.     Registered nurses in the [facility] provide clinical advisement, health education, appointment booking, and other advisory services.  Call for this free service at [phone].        Advance Directives     An advance directive is a document which, in the event you are no longer able to make decisions for yourself, tells your healthcare team what kind of treatment you do or do not want to receive, or who you would like to make those decisions for you.  If you do not currently have an advance directive, [facility] encourages you to create one.  For more information call:  [phone]), [phone] ([phone]),  or log on to [email or web address].        Language Assistance Services     ATTENTION: Language assistance services are available, free of charge. Please call [phone].      ATENCIÓN: Si habla español, tiene a lowry disposición servicios gratuitos de asistencia lingüística.  Ronnie martino [phone].     [facility] Ý: N?u b?n nói Ti?ng Vi?t, có các d?ch v? h? tr? ngôn ng? mi?n phí dành cho b?n. G?i s? [phone].        [facility] Sign-Up     Activating your [facility] account is as easy as 1-2-3!     1) Visit [email or web address], select Sign Up Now, enter this activation code and your date of birth, then select Next.  [email or web address]  Expires: 4/23/2017  3:24 PM      2) Create a username and password to use when you visit [facility] in the future and select a security question in case you lose your password and select Next.    3) Enter your e-mail address and click Sign Up!    Additional Information  If you have questions, please e-mail [email or web address] or call [phone] to talk to our [facility] staff. Remember, [facility] is NOT to be used for urgent needs. For medical emergencies, dial 911.          [facility] complies with applicable Federal civil rights laws and does not discriminate on the basis of race, color, national origin, age, disability, or sex.

## 2017-03-14 NOTE — PLAN OF CARE
Patient sitting up on side of the bed dressed, family at bedside. Patient aaox3. Vss. States he has a slight sore throat but refuses pain medication. Prescriptions provided to the patient. Instructions given to the patient and his family per MD orders with time allotted for questioning, verbalized understanding. Patient discharged via wheelchair to the car with family.

## 2017-03-14 NOTE — PLAN OF CARE
VSS. AAOx 3. Denies pain or discomfort @ this time. No changes noted. Report given to KEVAN Lacey, with time allotted for questions.

## 2017-03-14 NOTE — DISCHARGE INSTRUCTIONS
***NO HEAVY LIFTING OR BENDING  ***NO STRAWS.  ***COOL, SOFT DIET.    DIET: You may resume your home diet. If nausea is present, increase your diet gradually with fluids and bland foods    ACTIVITY LEVEL: If you have received sedation or an anesthetic, you may feel sleepy for several hours. Rest until you are more awake. Gradually resume your normal activities    Medications: Pain medication should be taken only if needed and as directed. If antibiotics are prescribed, the medication should be taken until completed. You will be given an updated list of you medications.    No driving, alcoholic beverages or signing legal documents for next 24 hours or while taking pain medication.       CALL THE DOCTOR:    For any obvious bleeding (some dried blood over the incision is normal).      Redness, swelling, foul smell around incision or fever over 101.   Shortness of breath, Coughing up Bloody sputum, Pains or Swelling in your Calves .   Persistent pain or nausea not relieved by medication.    If any unusual problems or difficulties occur contact your doctor. If you cannot contact your doctor but feel your signs and symptoms warrant a physicians attention return to the emergency room.      Discharge Instructions: After Your Surgery  Youve just had surgery. During surgery you were given medicine called anesthesia to keep you relaxed and free of pain. After surgery you may have some pain or nausea. This is common. Here are some tips for feeling better and getting well after surgery.     Stay on schedule with your medication.   Going home  Your doctor or nurse will show you how to take care of yourself when you go home. He or she will also answer your questions. Have an adult family member or friend drive you home. For the first 24 hours after your surgery:  · Do not drive or use heavy equipment.  · Do not make important decisions or sign legal papers.  · Do not drink alcohol.  · Have someone stay with you, if needed.  He or she can watch for problems and help keep you safe.  Be sure to go to all follow-up visits with your doctor. And rest after your surgery for as long as your doctor tells you to.  Coping with pain  If you have pain after surgery, pain medicine will help you feel better. Take it as told, before pain becomes severe. Also, ask your doctor or pharmacist about other ways to control pain. This might be with heat, ice, or relaxation. And follow any other instructions your surgeon or nurse gives you.  Tips for taking pain medicine  To get the best relief possible, remember these points:  · Pain medicines can upset your stomach. Taking them with a little food may help.  · Most pain relievers taken by mouth need at least 20 to 30 minutes to start to work.  · Taking medicine on a schedule can help you remember to take it. Try to time your medicine so that you can take it before starting an activity. This might be before you get dressed, go for a walk, or sit down for dinner.  · Constipation is a common side effect of pain medicines. Call your doctor before taking any medicines such as laxatives or stool softeners to help ease constipation. Also ask if you should skip any foods. Drinking lots of fluids and eating foods such as fruits and vegetables that are high in fiber can also help. Remember, do not take laxatives unless your surgeon has prescribed them.  · Drinking alcohol and taking pain medicine can cause dizziness and slow your breathing. It can even be deadly. Do not drink alcohol while taking pain medicine.  · Pain medicine can make you react more slowly to things. Do not drive or run machinery while taking pain medicine.  Your health care provider may tell you to take acetaminophen to help ease your pain. Ask him or her how much you are supposed to take each day. Acetaminophen or other pain relievers may interact with your prescription medicines or other over-the-counter (OTC) drugs. Some prescription medicines  have acetaminophen and other ingredients. Using both prescription and OTC acetaminophen for pain can cause you to overdose. Read the labels on your OTC medicines with care. This will help you to clearly know the list of ingredients, how much to take, and any warnings. It may also help you not take too much acetaminophen. If you have questions or do not understand the information, ask your pharmacist or health care provider to explain it to you before you take the OTC medicine.  Managing nausea  Some people have an upset stomach after surgery. This is often because of anesthesia, pain, or pain medicine, or the stress of surgery. These tips will help you handle nausea and eat healthy foods as you get better. If you were on a special food plan before surgery, ask your doctor if you should follow it while you get better. These tips may help:  · Do not push yourself to eat. Your body will tell you when to eat and how much.  · Start off with clear liquids and soup. They are easier to digest.  · Next try semi-solid foods, such as mashed potatoes, applesauce, and gelatin, as you feel ready.  · Slowly move to solid foods. Dont eat fatty, rich, or spicy foods at first.  · Do not force yourself to have 3 large meals a day. Instead eat smaller amounts more often.  · Take pain medicines with a small amount of solid food, such as crackers or toast, to avoid nausea.     Call your surgeon if  · You still have pain an hour after taking medicine. The medicine may not be strong enough.  · You feel too sleepy, dizzy, or groggy. The medicine may be too strong.  · You have side effects like nausea, vomiting, or skin changes, such as rash, itching, or hives.       If you have obstructive sleep apnea  You were given anesthesia medicine during surgery to keep you comfortable and free of pain. After surgery, you may have more apnea spells because of this medicine and other medicines you were given. The spells may last longer than  usual.   At home:  · Keep using the continuous positive airway pressure (CPAP) device when you sleep. Unless your health care provider tells you not to, use it when you sleep, day or night. CPAP is a common device used to treat obstructive sleep apnea.  · Talk with your provider before taking any pain medicine, muscle relaxants, or sedatives. Your provider will tell you about the possible dangers of taking these medicines.  Date Last Reviewed: 10/16/2014  © 0664-5774 [employer]. [address] [place] [address]. All rights reserved. This information is not intended as a substitute for professional medical care. Always follow your healthcare professional's instructions.      Tonsillectomy, Post-Op Bleeding  You have had surgery to remove your tonsils. Bleeding at the surgery site can happen after surgery. The doctor can often control it using direct pressure or applying medicine to shrink the blood vessels. If this fails, you will need to return to the operating room for further treatment. Notify your doctor at once or return to this hospital if there are signs of any further bleeding.  Home Care  Your throat will be sore. Throat pain after surgery improves over the first 3-5 days. It is normal to have more pain at the end of the first week before it finally goes away.  Soft foods will be easier to swallow.  Drink plenty of fluids to prevent dehydration. You must continue to drink even though your throat hurts.  For pain relief, suck on ice cubes or frozen fruit pops, eat ice cream or sherbet, and drink cold liquids. You may also use liquid acetaminophen. Avoid taking ibuprofen or aspirin. These may increase bleeding risk. If your doctor has prescribed pain medication, take this as directed.   If antibiotics were prescribed, take these as directed until they are gone.  Do not overheat your home since this dries the air and may irritate throat tissues, especially at night. A cool-mist  humidifier in the bedroom may help.  Avoid exposure to people with colds or the flu during the recovery period. You may be more likely to get ill during this time.  Smoking irritates the surgery site. If you smoke, this is a good time to stop.  Avoid any heavy exercise, lifting, or straining for the next 10 days.  Follow-up care  Follow up with your doctor or this facility as advised.  When to see medical advice  Call your doctor right away if any of the following occur:  Trouble speaking, swallowing, or breathing  Nausea and vomiting  Bleeding from the mouth  Fever over 100.4°F (38.3ºC)  Increasing pain not controlled by pain medications  Signs of dehydration: Very dark urine, less urine, dry mouth, weakness  Date Last Reviewed: 4/20/2015 © 2000-2016 [employer]. [address] [place] [address]. All rights reserved. This information is not intended as a substitute for professional medical care. Always follow your healthcare professional's instructions.        After Tonsillectomy/Adenoidectomy     Drinking plenty of fluids will help your child recover.   Your child has had surgery to remove tonsils and/or adenoids. Your child will need time to get better. Below are guidelines for your childs recovery.  Pain and Activity  · Expect your child to have some throat or ear pain for 1-2 weeks.  · Limit activity for 1-2 weeks or as advised.  Diet  Make sure your child gets enough fluids and nutrients. Food and drink guidelines include:  · Give lots of fluids. Good choices are water, popsicles, and mild juices. (Do not give citrus juice or other acidic juices.)  · Give soft foods to eat. These include gelatin, pudding, ice cream, scrambled eggs, pasta, and mashed foods.  · Do not give spicy, acidic, or rough foods. These include fresh fruits, toast, crackers, and potato chips.  Medication  Give only medications approved by your childs health care provider. Follow directions closely when giving your  child medications.  · Your child may be prescribed pain medication.  · Do not give your child ibuprofen or aspirin. They may cause bleeding. If needed for discomfort, you can give your child acetaminophen instead.  When to Call Your Child's Health Care Provider  Mild pain and a slight fever are normal after surgery. The surgical site will turn whitish while it is healing. This is normal and not an infection. But call your child's health care provider right away if your otherwise healthy child has any of the following:  · Persistent fever:  ¨ In a child 3 to 36 months, a rectal temperature of 102°F (39.0°C) or higher  ¨ In a child of any age who has a temperature of 103°F (39.4°C) or higher  ¨ A fever that lasts more than 24-hours in a child under 2 years old, or for 3 days in a child 2 years or older  ¨ Your child has had a seizure caused by the fever  · Severe pain not relieved by medication  · Bright red bleeding, which includes fast bleeding, spitting, or coughing up a large clot, or blood-tinged spit that continues  · Trouble breathing   Date Last Reviewed: 9/8/2014  © 6863-7236 [employer]. [address] [place] [address], [place] [address]. All rights reserved. This information is not intended as a substitute for professional medical care. Always follow your healthcare professional's instructions.

## 2017-03-14 NOTE — ANESTHESIA POSTPROCEDURE EVALUATION
Anesthesia Post Evaluation    Patient: Anjum Lee    Procedure(s) Performed: Procedure(s) (LRB):  TONSILLECTOMY (Bilateral)    Final Anesthesia Type: general  Patient location during evaluation: PACU  Patient participation: Yes- Able to Participate  Level of consciousness: awake and alert  Post-procedure vital signs: reviewed and stable  Pain management: adequate  Airway patency: patent  PONV status at discharge: No PONV  Anesthetic complications: no      Cardiovascular status: hemodynamically stable  Respiratory status: unassisted  Hydration status: euvolemic  Follow-up not needed.        Visit Vitals    /74    Pulse 72    Temp 36.8 °C (98.2 °F) (Oral)    Resp 16    SpO2 95%       Pain/Ana Score: Pain Assessment Performed: Yes (3/14/2017 12:38 PM)  Presence of Pain: denies (3/14/2017 12:38 PM)  Ana Score: 10 (3/14/2017 12:38 PM)

## 2017-03-15 NOTE — DISCHARGE SUMMARY
DATE OF ADMISSION:  03/14/2017    DATE OF DISCHARGE:  03/14/2017    REASON FOR ADMISSION:  Tonsillectomy to further evaluate recent tonsillar   hypertrophy.    ADMITTING PHYSICIAN AND SURGEON:  Phoebe Benitez M.D.    SUMMARY OF HOSPITAL COURSE:  Anjum Lee is a 31-year-old gentleman who   recently experienced enlargement of his tonsils over the last 3 months in the   absence of recurring tonsil and throat infection.  Differential diagnosis   includes lymphoma and other neoplastic processes as well as inflammatory   processes.  His options, pros, cons, and risks have been reviewed in detail and   he elected to proceed with tonsillectomy at this time.  The surgery was   uneventful.  He was transferred to Recovery Room in good condition and   progressed timely to the outpatient surgical area.  He continued to do well and   met criteria for discharge on this same day of admission.  He was discharged in   good condition with stable vital signs, tolerating a tonsillectomy diet with   pain control with oral hydrocodone.  His instructions have been reviewed in   detail in the office setting 1 week prior as well as a written office   instruction sheet from my office.  He is to return to the office in 2 weeks.  He   is to continue a soft cool diet with increased fluids.  He is to take the   medications as prescribed including;  1.  Augmentin 600 per teaspoon one and a half teaspoons twice a day.  2.  Norco 7.5/325, he is to take 1 tablet every 4-6 hours as needed for pain.  3.  Ondansetron 4 mg orally disintegrating tablet, 1 tablet dissolved in the   mouth every 8 hours as needed for nausea and vomiting.    He is to call the office if any questions or problems and will otherwise be seen   approximately 2 weeks postop.      ER/SN  dd: 03/14/2017 17:40:36 (CDT)  td: 03/14/2017 23:59:37 (CDT)  Doc ID   #[id number]  Job ID #[id number]    CC:

## 2017-03-15 NOTE — OP NOTE
DATE OF PROCEDURE:  03/14/2017    SURGEON:  Phoebe Benitez M.D.    PREOPERATIVE DIAGNOSIS:  Tonsillar hypertrophy of uncertain etiology.    POSTOPERATIVE DIAGNOSIS:  Tonsillar hypertrophy of uncertain etiology.    PROCEDURE:  Bilateral tonsillectomy.    PROCEDURE IN DETAIL:  Earl Lee is a 31-year-old gentleman who presented   with progressive enlargement of his tonsils over the prior approximately 3   months.  There was no antecedent upper respiratory or tonsillar infection, nor   history of recurring throat infections.  There has been no pain or soreness in   the throat and he is asymptomatic otherwise except for the fullness in his   throat, which he notes most on swallowing as well as loud snoring with no sleep   apnea symptoms over the last 2-3 months.  Preoperative chest x-ray and blood   work has been unremarkable.  His various options and pros and cons and risks as   well as the procedure, consent form and expected postoperative course all had   been reviewed in detail with the patient in the office setting.  In addition,   the differential diagnosis had been reviewed including the need to evaluate for   lymphoma, although this may in fact be inflammatory in nature.  All of this had   been explained to the patient to his satisfaction.  His questions were answered.    He understands and accepted all of this and elected to proceed with surgery at   this time.  Appropriate informed consent had been obtained in the outpatient   office setting approximately 1 week prior to surgery.  He is therefore brought   to surgery at this time, where general anesthesia was induced.  He was prepped   and draped in the usual fashion for tonsillectomy.  His head was supported in a   foam donut-style headrest.  The Raphael-Jairo mouth gag was inserted and suspended   allowing good visualization of the very enlarged tonsils.  Bilateral   tonsillectomy was carried out with electrocautery dissection.  Dissection was    carried out over the capsule of each tonsil cauterizing feeder vessels as they   were encountered.  This also necessitated periodic used the suctioned   electrocautery.  Care was taken to preserve as much surrounding normal mucosa   and tonsillar pillars as possible.  Upon removal of each tonsil, the tonsillar   fossa was irrigated with cool saline.  Each tonsil was labeled and submitted   separately, left tonsil than right tonsil.  These two specimens were submitted   in a fresh state.  After discussing with the pathologist, the patient's history   of recent tonsillar enlargement in the absence of recurring infection and the   pathologist understands that the tissue is to be evaluated for lymphoma as well   as other neoplastic processes.  Upon removal of each tonsil, the tonsillar fossa   and oral cavity were irrigated with cool saline.  Few potential bleeding points   were touched up with the suction electrocautery.  Mouthguard was relaxed for a   few minutes.  Reinspection revealed continued good hemostasis.  An orogastric   tube was passed into the esophagus and stomach evacuating stomach contents and   then withdrawn.  The mouthguard was then relaxed and removed.  The patient was   awakened from anesthesia.  He was taken to Recovery Room in good condition.  He   tolerated the procedure well with no complications and blood loss was 20 mL or   less.      ER/SN  dd: 03/14/2017 17:33:50 (CDT)  td: 03/14/2017 22:14:27 (CDT)  Doc ID   #[id number]  Job ID #[id number]    CC:

## 2017-03-16 LAB
FLOW CYTOMETRY ANTIBODIES ANALYZED - TISSUE: NORMAL
FLOW CYTOMETRY COMMENT - TISSUE: NORMAL
FLOW CYTOMETRY INTERPRETATION - TISSUE: NORMAL
SPECIMEN TYPE - TISSUE: NORMAL

## 2018-07-04 NOTE — TRANSFER OF CARE
Anesthesia Transfer of Care Note    Patient: Anjum Lee    Procedure(s) Performed: Procedure(s) (LRB):  TONSILLECTOMY (Bilateral)    Patient location: PACU    Anesthesia Type: general    Transport from OR: Transported from OR on 6-10 L/min O2 by face mask with adequate spontaneous ventilation    Post pain: adequate analgesia    Post assessment: no apparent anesthetic complications    Post vital signs: stable    Level of consciousness: awake and alert    Nausea/Vomiting: no nausea/vomiting    Complications: none          Last vitals:   Visit Vitals    /72    Pulse 70    Temp 36.4 °C (97.5 °F) (Oral)    Resp 16    SpO2 97%      pt confused

## 2020-10-05 ENCOUNTER — LAB VISIT (OUTPATIENT)
Dept: LAB | Facility: HOSPITAL | Age: 35
End: 2020-10-05
Attending: INTERNAL MEDICINE
Payer: COMMERCIAL

## 2020-10-05 DIAGNOSIS — R68.89 FLU-LIKE SYMPTOMS: ICD-10-CM

## 2020-10-05 PROCEDURE — 36415 COLL VENOUS BLD VENIPUNCTURE: CPT

## 2020-10-05 PROCEDURE — 86769 SARS-COV-2 COVID-19 ANTIBODY: CPT

## 2020-10-06 LAB — SARS-COV-2 IGG SERPLBLD QL IA.RAPID: NEGATIVE

## 2021-04-15 ENCOUNTER — PATIENT MESSAGE (OUTPATIENT)
Dept: RESEARCH | Facility: HOSPITAL | Age: 36
End: 2021-04-15

## 2022-12-22 ENCOUNTER — OFFICE VISIT (OUTPATIENT)
Dept: URGENT CARE | Facility: CLINIC | Age: 37
End: 2022-12-22
Payer: COMMERCIAL

## 2022-12-22 VITALS
HEART RATE: 104 BPM | OXYGEN SATURATION: 95 % | WEIGHT: 235 LBS | TEMPERATURE: 99 F | RESPIRATION RATE: 18 BRPM | DIASTOLIC BLOOD PRESSURE: 89 MMHG | HEIGHT: 72 IN | SYSTOLIC BLOOD PRESSURE: 128 MMHG | BODY MASS INDEX: 31.83 KG/M2

## 2022-12-22 DIAGNOSIS — R05.9 COUGH, UNSPECIFIED TYPE: ICD-10-CM

## 2022-12-22 DIAGNOSIS — U07.1 COVID-19 VIRUS DETECTED: ICD-10-CM

## 2022-12-22 DIAGNOSIS — U07.1 COVID-19 VIRUS INFECTION: Primary | ICD-10-CM

## 2022-12-22 LAB
CTP QC/QA: YES
SARS-COV-2 AG RESP QL IA.RAPID: POSITIVE

## 2022-12-22 PROCEDURE — 87811 SARS CORONAVIRUS 2 ANTIGEN POCT, MANUAL READ: ICD-10-PCS | Mod: QW,S$GLB,, | Performed by: NURSE PRACTITIONER

## 2022-12-22 PROCEDURE — 3079F PR MOST RECENT DIASTOLIC BLOOD PRESSURE 80-89 MM HG: ICD-10-PCS | Mod: CPTII,S$GLB,, | Performed by: NURSE PRACTITIONER

## 2022-12-22 PROCEDURE — 1160F PR REVIEW ALL MEDS BY PRESCRIBER/CLIN PHARMACIST DOCUMENTED: ICD-10-PCS | Mod: CPTII,S$GLB,, | Performed by: NURSE PRACTITIONER

## 2022-12-22 PROCEDURE — 3008F PR BODY MASS INDEX (BMI) DOCUMENTED: ICD-10-PCS | Mod: CPTII,S$GLB,, | Performed by: NURSE PRACTITIONER

## 2022-12-22 PROCEDURE — 3074F SYST BP LT 130 MM HG: CPT | Mod: CPTII,S$GLB,, | Performed by: NURSE PRACTITIONER

## 2022-12-22 PROCEDURE — 1159F MED LIST DOCD IN RCRD: CPT | Mod: CPTII,S$GLB,, | Performed by: NURSE PRACTITIONER

## 2022-12-22 PROCEDURE — 3008F BODY MASS INDEX DOCD: CPT | Mod: CPTII,S$GLB,, | Performed by: NURSE PRACTITIONER

## 2022-12-22 PROCEDURE — 1160F RVW MEDS BY RX/DR IN RCRD: CPT | Mod: CPTII,S$GLB,, | Performed by: NURSE PRACTITIONER

## 2022-12-22 PROCEDURE — 87811 SARS-COV-2 COVID19 W/OPTIC: CPT | Mod: QW,S$GLB,, | Performed by: NURSE PRACTITIONER

## 2022-12-22 PROCEDURE — 3074F PR MOST RECENT SYSTOLIC BLOOD PRESSURE < 130 MM HG: ICD-10-PCS | Mod: CPTII,S$GLB,, | Performed by: NURSE PRACTITIONER

## 2022-12-22 PROCEDURE — 1159F PR MEDICATION LIST DOCUMENTED IN MEDICAL RECORD: ICD-10-PCS | Mod: CPTII,S$GLB,, | Performed by: NURSE PRACTITIONER

## 2022-12-22 PROCEDURE — 99204 OFFICE O/P NEW MOD 45 MIN: CPT | Mod: S$GLB,,, | Performed by: NURSE PRACTITIONER

## 2022-12-22 PROCEDURE — 99204 PR OFFICE/OUTPT VISIT, NEW, LEVL IV, 45-59 MIN: ICD-10-PCS | Mod: S$GLB,,, | Performed by: NURSE PRACTITIONER

## 2022-12-22 PROCEDURE — 3079F DIAST BP 80-89 MM HG: CPT | Mod: CPTII,S$GLB,, | Performed by: NURSE PRACTITIONER

## 2022-12-22 RX ORDER — FLUTICASONE PROPIONATE 50 MCG
2 SPRAY, SUSPENSION (ML) NASAL DAILY
Qty: 18.2 ML | Refills: 0 | Status: SHIPPED | OUTPATIENT
Start: 2022-12-22 | End: 2022-12-22

## 2022-12-22 RX ORDER — BENZONATATE 100 MG/1
100 CAPSULE ORAL 3 TIMES DAILY PRN
Qty: 30 CAPSULE | Refills: 0 | Status: SHIPPED | OUTPATIENT
Start: 2022-12-22 | End: 2023-01-01

## 2022-12-22 RX ORDER — CETIRIZINE HYDROCHLORIDE 10 MG/1
10 TABLET ORAL DAILY
Qty: 30 TABLET | Refills: 0 | Status: SHIPPED | OUTPATIENT
Start: 2022-12-22 | End: 2023-01-09

## 2022-12-22 NOTE — PATIENT INSTRUCTIONS
"You have tested positive for COVID-19 today.      ISOLATION  If you tested positive and do not have symptoms, you must isolate for 5 days starting on the day of the positive test.    If you tested positive and have symptoms, you must isolate for 5 days starting on the day of the first symptoms, not the day of the positive test.     This is the most important part, both the CDC and the [facility] emphasize that you do not test out of isolation.     After 5 days, if your symptoms have improved and you have not had fever on day 5, you can return to the community on day 6- NO TESTING REQUIRED!      In fact, we do not re-test if you were positive in the last 90 days.    After your 5 days of isolation are completed, the CDC recommends strict mask use for the first 5 days that you come out of isolation.    -----    If your condition worsens or fails to improve, we recommend that you receive another evaluation at the ER immediately contact your PCP to discuss your concerns, or return here.  You must understand that you've received an urgent care treatment only, and that you may be released before all your medical problems are known or treated.  You, the patient, will arrange for follow-up care as instructed.     If we discussed that I think your illness is viral, it will not respond to antibiotics and will last 10-14 days.  If we discussed "wait and see" antibiotics, and if over the next few days the symptoms worsen, start the antibiotics I have given you.     If you are female and on birth control pills and do take the antibiotics, use additional methods to prevent pregnancy while on the antibiotics and for one cycle after.     Flonase (fluticasone) is a nasal spray which is available over the counter and may help with your symptoms.  Zyrtec D, Claritin D, or Allegra D can also help with symptoms of congestion and drainage.  If you have hypertension, avoid using the "D" which is the decongestant formula.    If you just have " drainage, you can take plain Zyrtec, Claritin, or Allegra.  If you just have a congested feeling, you can take pseudoephedrine (unless you have high blood pressure), which you have to sign for behind the counter.  Do not buy phenylephrine OTC, as it is not effective.    Rest and fluids are also important.  Tylenol or ibuprofen can also be used as directed for pain, unless you have an allergy to them or medical condition (such as stomach ulcers, kidney or liver disease, or use blood thinners, etc.) for which you should not be taking these type of medications.     If you are flying in the next few days, Afrin nose drops for the airplane flight upon take off and landing may help.  Other than at those times, refrain from using Afrin.     If you were prescribed a narcotic or sedating cough medicine, do not drive or operate heavy machinery while taking these medications.

## 2022-12-22 NOTE — PROGRESS NOTES
Subjective:       Patient ID: Anjum Lee is a 37 y.o. male.    Vitals:  height is 6' (1.829 m) and weight is 106.6 kg (235 lb). His temperature is 98.7 °F (37.1 °C). His blood pressure is 128/89 and his pulse is 104. His respiration is 18 and oxygen saturation is 95%.     Chief Complaint: COVID-19 Concerns (Took an at-home COVID test this morning and it was positive.  Unsure how accurate these are so wanted to get an actual COVID test and this will provide the necessary documentation for work. - Entered by patient)    36yo male pt reports fever (Tmax 101.0F), chills, sweating, mild productive cough, body aches, and headaches x2 days.  Denies n/v/d.  Denies chest pain, wheezing, or SOB.  Reports multiple known sick contacts at work.  Reports COVID vaccination, denies flu vaccination.  Reports positive at-home COVID test this morning.    Other  This is a new problem. The current episode started in the past 7 days. The problem has been gradually worsening. Associated symptoms include chills, congestion, coughing, diaphoresis, a fever, headaches and myalgias. Pertinent negatives include no chest pain, nausea, sore throat or vomiting. Treatments tried: dayquil. The treatment provided no relief.     Constitution: Positive for chills, sweating and fever.   HENT:  Positive for congestion and postnasal drip. Negative for ear pain and sore throat.    Cardiovascular:  Negative for chest pain.   Respiratory:  Positive for cough and sputum production. Negative for chest tightness, shortness of breath and wheezing.    Gastrointestinal:  Negative for nausea, vomiting and diarrhea.   Musculoskeletal:  Positive for muscle ache.   Neurological:  Positive for headaches.     Objective:      Physical Exam   Constitutional: He is oriented to person, place, and time. He appears well-developed. He is cooperative.  Non-toxic appearance. He does not appear ill. No distress.   HENT:   Head: Normocephalic and atraumatic.   Ears:    Right Ear: Hearing, external ear and ear canal normal. Tympanic membrane is bulging. Tympanic membrane is not erythematous and not retracted. A middle ear effusion (clear fluid) is present.   Left Ear: Hearing, external ear and ear canal normal. Tympanic membrane is bulging. Tympanic membrane is not erythematous and not retracted. A middle ear effusion (clear fluid) is present.   Nose: Mucosal edema (erythema to BL turbinates) and rhinorrhea (clear to BL nares) present. No purulent discharge or nasal deformity. No epistaxis. Right sinus exhibits no maxillary sinus tenderness and no frontal sinus tenderness. Left sinus exhibits no maxillary sinus tenderness and no frontal sinus tenderness.   Mouth/Throat: Uvula is midline and mucous membranes are normal. No trismus in the jaw. Normal dentition. No uvula swelling. Oropharyngeal exudate (clear postnasal drip) present. No posterior oropharyngeal edema or posterior oropharyngeal erythema. Tonsils are 1+ on the right. Tonsils are 1+ on the left. No tonsillar exudate.   Eyes: Conjunctivae and lids are normal. No scleral icterus.   Neck: Trachea normal and phonation normal. Neck supple. No edema present. No erythema present. No neck rigidity present.   Cardiovascular: Normal rate, regular rhythm, normal heart sounds and normal pulses.   Pulmonary/Chest: Effort normal and breath sounds normal. No accessory muscle usage or stridor. No tachypnea. No respiratory distress. He has no decreased breath sounds. He has no wheezes. He has no rhonchi. He has no rales.   Abdominal: Normal appearance.   Musculoskeletal: Normal range of motion.         General: No deformity. Normal range of motion.   Lymphadenopathy:        Head (right side): No submandibular and no posterior auricular adenopathy present.        Head (left side): No submandibular adenopathy present.   Neurological: He is alert and oriented to person, place, and time. He exhibits normal muscle tone. Coordination normal.    Skin: Skin is warm, dry, intact, not diaphoretic and not pale.   Psychiatric: His speech is normal and behavior is normal. Judgment and thought content normal.   Nursing note and vitals reviewed.    Results for orders placed or performed in visit on 12/22/22   SARS Coronavirus 2 Antigen, POCT Manual Read   Result Value Ref Range    SARS Coronavirus 2 Antigen Positive (A) Negative     Acceptable Yes            Assessment:       1. COVID-19 virus infection    2. Cough, unspecified type          Plan:       COVID-19 Risk Score:  2    Provided education on Paxlovid and antiviral treatment, pt confirms no daily medications or chronic diagnoses.  Provided printed information sheet.    Provided education on OTC symptomatic treatment (Claritin/Zyrtec with Flonase nasal spray for sinus congestion and cough and Tylenol/ibuprofen for elevated temperature and body aches).  Provided education on CDC recommendations for isolation/masking and for return/ER precautions.  Pt verbalized understanding and agreed to treatment plan.      COVID-19 virus infection  -     cetirizine (ZYRTEC) 10 MG tablet; Take 1 tablet (10 mg total) by mouth once daily.  Dispense: 30 tablet; Refill: 0  -     fluticasone propionate (FLONASE) 50 mcg/actuation nasal spray; 2 sprays (100 mcg total) by Each Nostril route once daily.  Dispense: 18.2 mL; Refill: 0  -     nirmatrelvir-ritonavir 300 mg (150 mg x 2)-100 mg copackaged tablets (EUA); Take 3 tablets by mouth 2 (two) times daily for 5 days. Each dose contains 2 nirmatrelvir (pink tablets) and 1 ritonavir (white tablet). Take all 3 tablets together  Dispense: 30 tablet; Refill: 0    Cough, unspecified type  -     SARS Coronavirus 2 Antigen, POCT Manual Read  -     benzonatate (TESSALON) 100 MG capsule; Take 1 capsule (100 mg total) by mouth 3 (three) times daily as needed for Cough.  Dispense: 30 capsule; Refill: 0      Patient Instructions   You have tested positive for COVID-19 today.   "    ISOLATION  If you tested positive and do not have symptoms, you must isolate for 5 days starting on the day of the positive test.    If you tested positive and have symptoms, you must isolate for 5 days starting on the day of the first symptoms, not the day of the positive test.     This is the most important part, both the CDC and the [facility] emphasize that you do not test out of isolation.     After 5 days, if your symptoms have improved and you have not had fever on day 5, you can return to the community on day 6- NO TESTING REQUIRED!      In fact, we do not re-test if you were positive in the last 90 days.    After your 5 days of isolation are completed, the CDC recommends strict mask use for the first 5 days that you come out of isolation.    -----    If your condition worsens or fails to improve, we recommend that you receive another evaluation at the ER immediately contact your PCP to discuss your concerns, or return here.  You must understand that you've received an urgent care treatment only, and that you may be released before all your medical problems are known or treated.  You, the patient, will arrange for follow-up care as instructed.     If we discussed that I think your illness is viral, it will not respond to antibiotics and will last 10-14 days.  If we discussed "wait and see" antibiotics, and if over the next few days the symptoms worsen, start the antibiotics I have given you.     If you are female and on birth control pills and do take the antibiotics, use additional methods to prevent pregnancy while on the antibiotics and for one cycle after.     Flonase (fluticasone) is a nasal spray which is available over the counter and may help with your symptoms.  Zyrtec D, Claritin D, or Allegra D can also help with symptoms of congestion and drainage.  If you have hypertension, avoid using the "D" which is the decongestant formula.    If you just have drainage, you can take plain Zyrtec, Claritin, or " Allegra.  If you just have a congested feeling, you can take pseudoephedrine (unless you have high blood pressure), which you have to sign for behind the counter.  Do not buy phenylephrine OTC, as it is not effective.    Rest and fluids are also important.  Tylenol or ibuprofen can also be used as directed for pain, unless you have an allergy to them or medical condition (such as stomach ulcers, kidney or liver disease, or use blood thinners, etc.) for which you should not be taking these type of medications.     If you are flying in the next few days, Afrin nose drops for the airplane flight upon take off and landing may help.  Other than at those times, refrain from using Afrin.     If you were prescribed a narcotic or sedating cough medicine, do not drive or operate heavy machinery while taking these medications.

## 2023-01-07 ENCOUNTER — TELEPHONE (OUTPATIENT)
Dept: URGENT CARE | Facility: CLINIC | Age: 38
End: 2023-01-07

## 2024-04-06 ENCOUNTER — OFFICE VISIT (OUTPATIENT)
Dept: URGENT CARE | Facility: CLINIC | Age: 39
End: 2024-04-06
Payer: COMMERCIAL

## 2024-04-06 VITALS
TEMPERATURE: 98 F | HEIGHT: 72 IN | OXYGEN SATURATION: 95 % | RESPIRATION RATE: 17 BRPM | DIASTOLIC BLOOD PRESSURE: 80 MMHG | HEART RATE: 69 BPM | BODY MASS INDEX: 32.78 KG/M2 | SYSTOLIC BLOOD PRESSURE: 125 MMHG | WEIGHT: 242 LBS

## 2024-04-06 DIAGNOSIS — H66.91 RIGHT ACUTE OTITIS MEDIA: Primary | ICD-10-CM

## 2024-04-06 PROCEDURE — 99213 OFFICE O/P EST LOW 20 MIN: CPT | Mod: S$GLB,,, | Performed by: NURSE PRACTITIONER

## 2024-04-06 RX ORDER — IPRATROPIUM BROMIDE 21 UG/1
2 SPRAY, METERED NASAL 2 TIMES DAILY
Qty: 30 ML | Refills: 0 | Status: SHIPPED | OUTPATIENT
Start: 2024-04-06 | End: 2024-04-13

## 2024-04-06 RX ORDER — AMOXICILLIN AND CLAVULANATE POTASSIUM 875; 125 MG/1; MG/1
1 TABLET, FILM COATED ORAL EVERY 12 HOURS
Qty: 20 TABLET | Refills: 0 | Status: SHIPPED | OUTPATIENT
Start: 2024-04-06 | End: 2024-04-16

## 2024-04-06 NOTE — PROGRESS NOTES
Subjective:      Patient ID: Anjmu Lee is a 38 y.o. male.    Vitals:  height is 6' (1.829 m) and weight is 109.8 kg (242 lb). His temperature is 98.2 °F (36.8 °C). His blood pressure is 125/80 and his pulse is 69. His respiration is 17 and oxygen saturation is 95%.     Chief Complaint: Ear Problem (My right ear seems to be hearing things quieter, almost like before your ears pop on a plane.  My left ear is fine.  I have also noticed it seems to be affecting my balance and sometimes I get a little dizzy. - Entered by patient)    This is a 38 y.o. male who presents today with a chief complaint of a sensation of right-ear fullness x3 days. He has also had some ringing in the ear; and some dizziness that began today. Feels like ear needs to pop (as if on airplane).     Ear Fullness   There is pain in the right ear. This is a new problem. The current episode started in the past 7 days. The problem occurs constantly. The problem has been unchanged. There has been no fever. The pain is at a severity of 0/10. The patient is experiencing no pain. Pertinent negatives include no ear discharge. Associated symptoms comments: dizziness. He has tried nothing for the symptoms. The treatment provided no relief.       Constitution: Negative for fever.   HENT:  Positive for ear pain and tinnitus. Negative for ear discharge.       Objective:     Physical Exam   Constitutional: He is oriented to person, place, and time.  Non-toxic appearance. He does not appear ill. No distress.   HENT:   Head: Normocephalic.   Ears:   Left Ear: Tympanic membrane, external ear and ear canal normal.      Comments: Right TM is dull with poor light reflex   Mouth/Throat: Mucous membranes are moist.   Cardiovascular: Normal rate.   Pulmonary/Chest: Effort normal.   Abdominal: Normal appearance.   Musculoskeletal: Normal range of motion.         General: Normal range of motion.   Neurological: He is alert and oriented to person, place, and time.    Skin: Skin is warm, dry and not diaphoretic.   Psychiatric: His behavior is normal. Mood normal.   Nursing note and vitals reviewed.      Assessment:     1. Right acute otitis media        Plan:       Right acute otitis media  -     amoxicillin-clavulanate 875-125mg (AUGMENTIN) 875-125 mg per tablet; Take 1 tablet by mouth every 12 (twelve) hours. for 10 days  Dispense: 20 tablet; Refill: 0  -     ipratropium (ATROVENT) 21 mcg (0.03 %) nasal spray; 2 sprays by Each Nostril route 2 (two) times daily. for 7 days  Dispense: 30 mL; Refill: 0      Patient Instructions   Take ibuprofen as ant-inflammatory (600-800mg every 6 hours as needed)  Take antihistamine such as zyrtec or claritin  Oral fluids  Blow nose often  Follow up with ENT if no improvement

## 2024-04-06 NOTE — PATIENT INSTRUCTIONS
Take ibuprofen as ant-inflammatory (600-800mg every 6 hours as needed)  Take antihistamine such as zyrtec or claritin  Oral fluids  Blow nose often  Follow up with ENT if no improvement

## 2024-09-19 ENCOUNTER — OFFICE VISIT (OUTPATIENT)
Dept: URGENT CARE | Facility: CLINIC | Age: 39
End: 2024-09-19
Payer: COMMERCIAL

## 2024-09-19 VITALS
RESPIRATION RATE: 18 BRPM | DIASTOLIC BLOOD PRESSURE: 82 MMHG | HEART RATE: 82 BPM | TEMPERATURE: 98 F | WEIGHT: 245 LBS | SYSTOLIC BLOOD PRESSURE: 123 MMHG | OXYGEN SATURATION: 96 % | BODY MASS INDEX: 33.23 KG/M2

## 2024-09-19 DIAGNOSIS — L03.113 CELLULITIS OF RIGHT ELBOW: Primary | ICD-10-CM

## 2024-09-19 RX ORDER — LIDOCAINE HYDROCHLORIDE 10 MG/ML
2.1 INJECTION, SOLUTION INFILTRATION; PERINEURAL
Status: COMPLETED | OUTPATIENT
Start: 2024-09-19 | End: 2024-09-19

## 2024-09-19 RX ORDER — CEFTRIAXONE 1 G/1
1 INJECTION, POWDER, FOR SOLUTION INTRAMUSCULAR; INTRAVENOUS
Status: COMPLETED | OUTPATIENT
Start: 2024-09-19 | End: 2024-09-19

## 2024-09-19 RX ORDER — MUPIROCIN 20 MG/G
OINTMENT TOPICAL
Qty: 22 G | Refills: 1 | Status: SHIPPED | OUTPATIENT
Start: 2024-09-19

## 2024-09-19 RX ORDER — SULFAMETHOXAZOLE AND TRIMETHOPRIM 800; 160 MG/1; MG/1
1 TABLET ORAL 2 TIMES DAILY
Qty: 20 TABLET | Refills: 0 | Status: SHIPPED | OUTPATIENT
Start: 2024-09-19

## 2024-09-19 RX ADMIN — CEFTRIAXONE 1 G: 1 INJECTION, POWDER, FOR SOLUTION INTRAMUSCULAR; INTRAVENOUS at 11:09

## 2024-09-19 RX ADMIN — LIDOCAINE HYDROCHLORIDE 2.1 ML: 10 INJECTION, SOLUTION INFILTRATION; PERINEURAL at 11:09

## 2024-09-19 NOTE — PROGRESS NOTES
Subjective:      Patient ID: Anjum Lee is a 39 y.o. male.    Vitals:  weight is 111.1 kg (245 lb). His oral temperature is 97.9 °F (36.6 °C). His blood pressure is 123/82 and his pulse is 82. His respiration is 18 and oxygen saturation is 96%.     Chief Complaint: Arm Injury    Pt presents with a swollen elbow. Redness present, and warmth. No itching, or fever. States he doesn't know what caused it. SX started yesterday. Cannot much extremity much without pain. States as long as he does not touch area that pain is a 1/10.    Has used ice at home along with elevation. Patient also has taken advil.     Edema  This is a new problem. The current episode started yesterday. The problem has been gradually worsening. Associated symptoms include weakness. Pertinent negatives include no abdominal pain, chills, congestion, coughing or nausea. The symptoms are aggravated by bending. He has tried NSAIDs for the symptoms. The treatment provided mild relief.       Constitution: Negative for chills.   HENT:  Negative for congestion.    Respiratory:  Negative for cough.    Gastrointestinal:  Negative for abdominal pain and nausea.      Objective:     Physical Exam    Assessment:     1. Cellulitis of right elbow        Plan:       Cellulitis of right elbow  -     cefTRIAXone injection 1 g  -     LIDOcaine HCL 10 mg/ml (1%) injection 2.1 mL  -     sulfamethoxazole-trimethoprim 800-160mg (BACTRIM DS) 800-160 mg Tab; Take 1 tablet by mouth 2 (two) times daily.  Dispense: 20 tablet; Refill: 0  -     mupirocin (BACTROBAN) 2 % ointment; Apply to affected area 3 times daily  Dispense: 22 g; Refill: 1    Warm compresses. RTC prn worsening symptoms

## 2024-09-23 ENCOUNTER — LAB VISIT (OUTPATIENT)
Dept: LAB | Facility: HOSPITAL | Age: 39
End: 2024-09-23
Attending: INTERNAL MEDICINE
Payer: COMMERCIAL

## 2024-09-23 DIAGNOSIS — E63.9 POOR EATING HABITS: ICD-10-CM

## 2024-09-23 DIAGNOSIS — Z13.21 ENCOUNTER FOR VITAMIN DEFICIENCY SCREENING: ICD-10-CM

## 2024-09-23 DIAGNOSIS — Z00.00 ROUTINE MEDICAL EXAM: ICD-10-CM

## 2024-09-23 LAB
25(OH)D3+25(OH)D2 SERPL-MCNC: 13 NG/ML (ref 30–96)
ALBUMIN SERPL BCP-MCNC: 4 G/DL (ref 3.5–5.2)
ALP SERPL-CCNC: 33 U/L (ref 55–135)
ALT SERPL W/O P-5'-P-CCNC: 36 U/L (ref 10–44)
ANION GAP SERPL CALC-SCNC: 10 MMOL/L (ref 8–16)
AST SERPL-CCNC: 23 U/L (ref 10–40)
BASOPHILS # BLD AUTO: 0.05 K/UL (ref 0–0.2)
BASOPHILS NFR BLD: 0.7 % (ref 0–1.9)
BILIRUB SERPL-MCNC: 0.6 MG/DL (ref 0.1–1)
BUN SERPL-MCNC: 22 MG/DL (ref 6–20)
CALCIUM SERPL-MCNC: 9.9 MG/DL (ref 8.7–10.5)
CHLORIDE SERPL-SCNC: 105 MMOL/L (ref 95–110)
CHOLEST SERPL-MCNC: 221 MG/DL (ref 120–199)
CHOLEST/HDLC SERPL: 5.3 {RATIO} (ref 2–5)
CO2 SERPL-SCNC: 25 MMOL/L (ref 23–29)
CREAT SERPL-MCNC: 1.1 MG/DL (ref 0.5–1.4)
DIFFERENTIAL METHOD BLD: NORMAL
EOSINOPHIL # BLD AUTO: 0.2 K/UL (ref 0–0.5)
EOSINOPHIL NFR BLD: 2.4 % (ref 0–8)
ERYTHROCYTE [DISTWIDTH] IN BLOOD BY AUTOMATED COUNT: 12.5 % (ref 11.5–14.5)
EST. GFR  (NO RACE VARIABLE): >60 ML/MIN/1.73 M^2
FOLATE SERPL-MCNC: 6 NG/ML (ref 4–24)
GLUCOSE SERPL-MCNC: 91 MG/DL (ref 70–110)
HCT VFR BLD AUTO: 41.4 % (ref 40–54)
HDLC SERPL-MCNC: 42 MG/DL (ref 40–75)
HDLC SERPL: 19 % (ref 20–50)
HGB BLD-MCNC: 14.6 G/DL (ref 14–18)
IMM GRANULOCYTES # BLD AUTO: 0.01 K/UL (ref 0–0.04)
IMM GRANULOCYTES NFR BLD AUTO: 0.1 % (ref 0–0.5)
LDLC SERPL CALC-MCNC: 160.6 MG/DL (ref 63–159)
LYMPHOCYTES # BLD AUTO: 2.5 K/UL (ref 1–4.8)
LYMPHOCYTES NFR BLD: 36.5 % (ref 18–48)
MCH RBC QN AUTO: 28.9 PG (ref 27–31)
MCHC RBC AUTO-ENTMCNC: 35.3 G/DL (ref 32–36)
MCV RBC AUTO: 82 FL (ref 82–98)
MONOCYTES # BLD AUTO: 0.6 K/UL (ref 0.3–1)
MONOCYTES NFR BLD: 8.6 % (ref 4–15)
NEUTROPHILS # BLD AUTO: 3.6 K/UL (ref 1.8–7.7)
NEUTROPHILS NFR BLD: 51.7 % (ref 38–73)
NONHDLC SERPL-MCNC: 179 MG/DL
NRBC BLD-RTO: 0 /100 WBC
PLATELET # BLD AUTO: 322 K/UL (ref 150–450)
PMV BLD AUTO: 9.2 FL (ref 9.2–12.9)
POTASSIUM SERPL-SCNC: 4.4 MMOL/L (ref 3.5–5.1)
PROT SERPL-MCNC: 7.6 G/DL (ref 6–8.4)
RBC # BLD AUTO: 5.05 M/UL (ref 4.6–6.2)
SODIUM SERPL-SCNC: 140 MMOL/L (ref 136–145)
TRIGL SERPL-MCNC: 92 MG/DL (ref 30–150)
WBC # BLD AUTO: 6.95 K/UL (ref 3.9–12.7)

## 2024-09-23 PROCEDURE — 82746 ASSAY OF FOLIC ACID SERUM: CPT | Performed by: INTERNAL MEDICINE

## 2024-09-23 PROCEDURE — 82306 VITAMIN D 25 HYDROXY: CPT | Performed by: INTERNAL MEDICINE

## 2024-09-23 PROCEDURE — 36415 COLL VENOUS BLD VENIPUNCTURE: CPT | Performed by: INTERNAL MEDICINE

## 2024-09-23 PROCEDURE — 85025 COMPLETE CBC W/AUTO DIFF WBC: CPT | Performed by: INTERNAL MEDICINE

## 2024-09-23 PROCEDURE — 80053 COMPREHEN METABOLIC PANEL: CPT | Performed by: INTERNAL MEDICINE

## 2024-09-23 PROCEDURE — 80061 LIPID PANEL: CPT | Performed by: INTERNAL MEDICINE

## (undated) DEVICE — POSITIONER HEAD DONUT 9IN FOAM

## (undated) DEVICE — SUCTION COAGULATOR 10FR 6IN

## (undated) DEVICE — ELECTRODE REM PLYHSV RETURN 9

## (undated) DEVICE — PACK HEAD & NECK

## (undated) DEVICE — SET IRR TUBE FOR DRILL

## (undated) DEVICE — NDL HYPO REG 25G X 1 1/2

## (undated) DEVICE — GLOVE SURGICAL LATEX SZ 6

## (undated) DEVICE — SEE MEDLINE ITEM 156955

## (undated) DEVICE — SEE MEDLINE ITEM 157117

## (undated) DEVICE — SEE MEDLINE ITEM 157116

## (undated) DEVICE — DRESSING TELFA N ADH 3X8

## (undated) DEVICE — SUT PLN GUT 2-0 CT-3 1X27

## (undated) DEVICE — BLADE SURGICAL GLASSVAN #12

## (undated) DEVICE — SLEEVE SCD EXPRESS CALF MEDIUM

## (undated) DEVICE — SEE MEDLINE ITEM 153151

## (undated) DEVICE — ELECTRODE BLADE INSULATED 1 IN

## (undated) DEVICE — SEE MEDLINE ITEM 146313

## (undated) DEVICE — SYR EAR & ULCER

## (undated) DEVICE — SEE MEDLINE ITEM 152622

## (undated) DEVICE — COVER OVERHEAD SURG LT BLUE

## (undated) DEVICE — MANIFOLD 4 PORT

## (undated) DEVICE — SYR 10CC LUER LOCK

## (undated) DEVICE — SOL NACL 0.9% INJ 500ML BG

## (undated) DEVICE — KIT ANTIFOG